# Patient Record
Sex: FEMALE | Race: BLACK OR AFRICAN AMERICAN | Employment: FULL TIME | URBAN - METROPOLITAN AREA
[De-identification: names, ages, dates, MRNs, and addresses within clinical notes are randomized per-mention and may not be internally consistent; named-entity substitution may affect disease eponyms.]

---

## 2018-10-11 ENCOUNTER — HOSPITAL ENCOUNTER (EMERGENCY)
Age: 48
Discharge: HOME | End: 2018-10-11
Attending: EMERGENCY MEDICINE
Payer: COMMERCIAL

## 2018-10-11 VITALS
WEIGHT: 150 LBS | SYSTOLIC BLOOD PRESSURE: 131 MMHG | DIASTOLIC BLOOD PRESSURE: 76 MMHG | RESPIRATION RATE: 16 BRPM | OXYGEN SATURATION: 99 % | TEMPERATURE: 98.5 F | BODY MASS INDEX: 27.6 KG/M2 | HEIGHT: 62 IN | HEART RATE: 54 BPM

## 2018-10-11 DIAGNOSIS — G43.009 MIGRAINE WITHOUT AURA AND WITHOUT STATUS MIGRAINOSUS, NOT INTRACTABLE: Primary | ICD-10-CM

## 2018-10-11 PROCEDURE — 96372 THER/PROPH/DIAG INJ SC/IM: CPT

## 2018-10-11 PROCEDURE — 99283 EMERGENCY DEPT VISIT LOW MDM: CPT

## 2018-10-11 PROCEDURE — 6360000002 HC RX W HCPCS: Performed by: EMERGENCY MEDICINE

## 2018-10-11 RX ORDER — BUTALBITAL, ACETAMINOPHEN, CAFFEINE AND CODEINE PHOSPHATE 300; 50; 40; 30 MG/1; MG/1; MG/1; MG/1
1 CAPSULE ORAL EVERY 6 HOURS PRN
Qty: 12 CAPSULE | Refills: 0 | Status: SHIPPED | OUTPATIENT
Start: 2018-10-11 | End: 2018-10-21

## 2018-10-11 RX ADMIN — HYDROMORPHONE HYDROCHLORIDE 1 MG: 1 INJECTION, SOLUTION INTRAMUSCULAR; INTRAVENOUS; SUBCUTANEOUS at 18:56

## 2018-10-11 RX ADMIN — PROCHLORPERAZINE EDISYLATE 10 MG: 5 INJECTION INTRAMUSCULAR; INTRAVENOUS at 18:56

## 2018-10-11 ASSESSMENT — PAIN DESCRIPTION - DESCRIPTORS: DESCRIPTORS: PRESSURE;SHARP

## 2018-10-11 ASSESSMENT — PAIN DESCRIPTION - FREQUENCY: FREQUENCY: CONTINUOUS

## 2018-10-11 ASSESSMENT — PAIN DESCRIPTION - LOCATION: LOCATION: HEAD

## 2018-10-11 ASSESSMENT — PAIN DESCRIPTION - PAIN TYPE: TYPE: ACUTE PAIN

## 2018-10-11 ASSESSMENT — PAIN DESCRIPTION - ORIENTATION: ORIENTATION: POSTERIOR

## 2018-10-11 ASSESSMENT — PAIN SCALES - GENERAL: PAINLEVEL_OUTOF10: 6

## 2019-06-07 NOTE — PROGRESS NOTES
[facility]  [facility]. Angi Hearn M.D. Michaela Paris M.D.  Molly Minor. Tam Ferrera M.D. Danielle Escalante M.D. Nan Arzate Cap, M.D. Jeaneth Pedro   1970  Kelle Bray,       This 50-year-old woman is seen for outpatient cardiac assessment today. She has no history of cardiac or vascular disease. She has a history of migraine headaches. She has recently developed episodic rapid forceful heart action. She believes this is best described as rapid beats rather than a \"flip-flop\". There are no accompanying symptoms other than anxiety. The episodes do not clearly relate to physical activity. Some they may seem to occur more frequently at work which she has a stressful element. 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 She denies exertional chest pain or dyspnea. She is not having unprovoked sweats syncope or presyncope. She will sometimes note axillary pain unrelated to physical activity or her palpitations. She also occasionally has knee pain unrelated to physical activity. She has not had leg swelling. Medical History:  History of migraine headaches  No history of hypertension, myocardial infarction CVA or kidney disease  Lipid panel, 05/28/2019. , HDL 59, total cholesterol 226. The patient is legally . 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 Family history: Negative for MI/sudden death.   Review of Systems:  Constitutional: negative for fever and chills  Respiratory: negative for cough and hemoptysis  Cardiovascular:   Gastrointestinal: negative for abdominal pain, diarrhea, nausea and vomiting  Genitourinary:negative for dysuria and hematuria  Derm: negative for rash and skin lesion(s)  Neurological: negative for seizures and tremors  Endocrine: negative for diabetic symptoms including polydipsia and polyuria  Musculoskeletal: negative for CTD  Psychiatric: negative for psychosis and major

## 2019-06-10 ENCOUNTER — OFFICE VISIT (OUTPATIENT)
Dept: CARDIOLOGY CLINIC | Age: 49
End: 2019-06-10
Payer: COMMERCIAL

## 2019-06-10 VITALS
DIASTOLIC BLOOD PRESSURE: 70 MMHG | WEIGHT: 147.2 LBS | BODY MASS INDEX: 27.09 KG/M2 | SYSTOLIC BLOOD PRESSURE: 108 MMHG | HEART RATE: 55 BPM | RESPIRATION RATE: 16 BRPM | HEIGHT: 62 IN

## 2019-06-10 DIAGNOSIS — R06.02 SHORTNESS OF BREATH: ICD-10-CM

## 2019-06-10 DIAGNOSIS — R00.2 PALPITATIONS: ICD-10-CM

## 2019-06-10 DIAGNOSIS — I51.9 HEART PROBLEM: Primary | ICD-10-CM

## 2019-06-10 PROCEDURE — 93000 ELECTROCARDIOGRAM COMPLETE: CPT | Performed by: INTERNAL MEDICINE

## 2019-06-10 PROCEDURE — G8427 DOCREV CUR MEDS BY ELIG CLIN: HCPCS | Performed by: INTERNAL MEDICINE

## 2019-06-10 PROCEDURE — G8419 CALC BMI OUT NRM PARAM NOF/U: HCPCS | Performed by: INTERNAL MEDICINE

## 2019-06-10 PROCEDURE — 99242 OFF/OP CONSLTJ NEW/EST SF 20: CPT | Performed by: INTERNAL MEDICINE

## 2019-06-27 ENCOUNTER — NURSE ONLY (OUTPATIENT)
Dept: CARDIOLOGY CLINIC | Age: 49
End: 2019-06-27

## 2019-06-27 ENCOUNTER — HOSPITAL ENCOUNTER (OUTPATIENT)
Dept: CARDIOLOGY | Age: 49
Discharge: HOME | End: 2019-06-27
Payer: COMMERCIAL

## 2019-06-27 DIAGNOSIS — R06.02 SHORTNESS OF BREATH: ICD-10-CM

## 2019-06-27 DIAGNOSIS — R00.2 PALPITATIONS: ICD-10-CM

## 2019-06-27 LAB
LV EF: 60 %
LVEF MODALITY: NORMAL

## 2019-06-27 PROCEDURE — 93306 TTE W/DOPPLER COMPLETE: CPT

## 2019-06-27 NOTE — PROGRESS NOTES
Pt was seen if office today for the placement of a 48 hour holter monitor ([employer]) per . Pt tolerated well and understood instructions.  Device # [id number]    [facility]

## 2019-07-08 ENCOUNTER — TELEPHONE (OUTPATIENT)
Dept: CARDIOLOGY CLINIC | Age: 49
End: 2019-07-08

## 2019-07-08 DIAGNOSIS — R00.2 PALPITATIONS: ICD-10-CM

## 2019-07-08 DIAGNOSIS — R06.02 SHORTNESS OF BREATH: ICD-10-CM

## 2021-12-10 ENCOUNTER — APPOINTMENT (OUTPATIENT)
Dept: GENERAL RADIOLOGY | Age: 51
End: 2021-12-10
Payer: COMMERCIAL

## 2021-12-10 PROCEDURE — 71046 X-RAY EXAM CHEST 2 VIEWS: CPT

## 2021-12-10 PROCEDURE — 99284 EMERGENCY DEPT VISIT MOD MDM: CPT

## 2021-12-10 PROCEDURE — 96365 THER/PROPH/DIAG IV INF INIT: CPT

## 2021-12-10 ASSESSMENT — PAIN SCALES - GENERAL: PAINLEVEL_OUTOF10: 5

## 2021-12-11 ENCOUNTER — HOSPITAL ENCOUNTER (OUTPATIENT)
Age: 51
Setting detail: OBSERVATION
Discharge: HOME | End: 2021-12-12
Attending: EMERGENCY MEDICINE | Admitting: INTERNAL MEDICINE
Payer: COMMERCIAL

## 2021-12-11 ENCOUNTER — APPOINTMENT (OUTPATIENT)
Dept: CT IMAGING | Age: 51
End: 2021-12-11
Payer: COMMERCIAL

## 2021-12-11 DIAGNOSIS — R06.02 SHORTNESS OF BREATH: Primary | ICD-10-CM

## 2021-12-11 DIAGNOSIS — J18.9 PNEUMONIA OF BOTH LUNGS DUE TO INFECTIOUS ORGANISM, UNSPECIFIED PART OF LUNG: ICD-10-CM

## 2021-12-11 DIAGNOSIS — U07.1 COVID: ICD-10-CM

## 2021-12-11 LAB
ALBUMIN SERPL-MCNC: 4.3 G/DL (ref 3.5–5.2)
ALP BLD-CCNC: 64 U/L (ref 35–104)
ALT SERPL-CCNC: 12 U/L (ref 0–32)
ANION GAP SERPL CALCULATED.3IONS-SCNC: 12 MMOL/L (ref 7–16)
AST SERPL-CCNC: 13 U/L (ref 0–31)
BASOPHILS ABSOLUTE: 0.04 E9/L (ref 0–0.2)
BASOPHILS RELATIVE PERCENT: 0.5 % (ref 0–2)
BILIRUB SERPL-MCNC: 0.3 MG/DL (ref 0–1.2)
BUN BLDV-MCNC: 17 MG/DL (ref 6–20)
C-REACTIVE PROTEIN: 1.9 MG/DL (ref 0–0.4)
CALCIUM SERPL-MCNC: 9.4 MG/DL (ref 8.6–10.2)
CHLORIDE BLD-SCNC: 101 MMOL/L (ref 98–107)
CO2: 26 MMOL/L (ref 22–29)
CREAT SERPL-MCNC: 0.7 MG/DL (ref 0.5–1)
D DIMER: 466 NG/ML DDU
EKG ATRIAL RATE: 60 BPM
EKG P AXIS: 59 DEGREES
EKG P-R INTERVAL: 210 MS
EKG Q-T INTERVAL: 414 MS
EKG QRS DURATION: 84 MS
EKG QTC CALCULATION (BAZETT): 414 MS
EKG R AXIS: 46 DEGREES
EKG T AXIS: 44 DEGREES
EKG VENTRICULAR RATE: 60 BPM
EOSINOPHILS ABSOLUTE: 0.09 E9/L (ref 0.05–0.5)
EOSINOPHILS RELATIVE PERCENT: 1 % (ref 0–6)
GFR AFRICAN AMERICAN: >60
GFR NON-AFRICAN AMERICAN: >60 ML/MIN/1.73
GLUCOSE BLD-MCNC: 97 MG/DL (ref 74–99)
HCT VFR BLD CALC: 38.1 % (ref 34–48)
HEMOGLOBIN: 12.4 G/DL (ref 11.5–15.5)
IMMATURE GRANULOCYTES #: 0.03 E9/L
IMMATURE GRANULOCYTES %: 0.3 % (ref 0–5)
LYMPHOCYTES ABSOLUTE: 3.42 E9/L (ref 1.5–4)
LYMPHOCYTES RELATIVE PERCENT: 38.7 % (ref 20–42)
MCH RBC QN AUTO: 30.6 PG (ref 26–35)
MCHC RBC AUTO-ENTMCNC: 32.5 % (ref 32–34.5)
MCV RBC AUTO: 94.1 FL (ref 80–99.9)
MONOCYTES ABSOLUTE: 0.87 E9/L (ref 0.1–0.95)
MONOCYTES RELATIVE PERCENT: 9.8 % (ref 2–12)
NEUTROPHILS ABSOLUTE: 4.39 E9/L (ref 1.8–7.3)
NEUTROPHILS RELATIVE PERCENT: 49.7 % (ref 43–80)
PDW BLD-RTO: 11.9 FL (ref 11.5–15)
PLATELET # BLD: 344 E9/L (ref 130–450)
PMV BLD AUTO: 10 FL (ref 7–12)
POTASSIUM SERPL-SCNC: 3.9 MMOL/L (ref 3.5–5)
PROCALCITONIN: 0.03 NG/ML (ref 0–0.08)
PROCALCITONIN: 0.04 NG/ML (ref 0–0.08)
RBC # BLD: 4.05 E12/L (ref 3.5–5.5)
SEDIMENTATION RATE, ERYTHROCYTE: 60 MM/HR (ref 0–20)
SODIUM BLD-SCNC: 139 MMOL/L (ref 132–146)
TOTAL PROTEIN: 8.2 G/DL (ref 6.4–8.3)
TROPONIN, HIGH SENSITIVITY: <6 NG/L (ref 0–9)
WBC # BLD: 8.8 E9/L (ref 4.5–11.5)

## 2021-12-11 PROCEDURE — 96376 TX/PRO/DX INJ SAME DRUG ADON: CPT

## 2021-12-11 PROCEDURE — 84484 ASSAY OF TROPONIN QUANT: CPT

## 2021-12-11 PROCEDURE — 6360000002 HC RX W HCPCS: Performed by: EMERGENCY MEDICINE

## 2021-12-11 PROCEDURE — 86140 C-REACTIVE PROTEIN: CPT

## 2021-12-11 PROCEDURE — 85378 FIBRIN DEGRADE SEMIQUANT: CPT

## 2021-12-11 PROCEDURE — 6360000002 HC RX W HCPCS: Performed by: NURSE PRACTITIONER

## 2021-12-11 PROCEDURE — G0378 HOSPITAL OBSERVATION PER HR: HCPCS

## 2021-12-11 PROCEDURE — 6370000000 HC RX 637 (ALT 250 FOR IP): Performed by: NURSE PRACTITIONER

## 2021-12-11 PROCEDURE — 84145 PROCALCITONIN (PCT): CPT

## 2021-12-11 PROCEDURE — 96372 THER/PROPH/DIAG INJ SC/IM: CPT

## 2021-12-11 PROCEDURE — 80053 COMPREHEN METABOLIC PANEL: CPT

## 2021-12-11 PROCEDURE — 2580000003 HC RX 258: Performed by: NURSE PRACTITIONER

## 2021-12-11 PROCEDURE — APPSS30 APP SPLIT SHARED TIME 16-30 MINUTES: Performed by: NURSE PRACTITIONER

## 2021-12-11 PROCEDURE — 99220 PR INITIAL OBSERVATION CARE/DAY 70 MINUTES: CPT | Performed by: INTERNAL MEDICINE

## 2021-12-11 PROCEDURE — 0202U NFCT DS 22 TRGT SARS-COV-2: CPT

## 2021-12-11 PROCEDURE — 96375 TX/PRO/DX INJ NEW DRUG ADDON: CPT

## 2021-12-11 PROCEDURE — 6360000004 HC RX CONTRAST MEDICATION: Performed by: RADIOLOGY

## 2021-12-11 PROCEDURE — 85651 RBC SED RATE NONAUTOMATED: CPT

## 2021-12-11 PROCEDURE — 85025 COMPLETE CBC W/AUTO DIFF WBC: CPT

## 2021-12-11 PROCEDURE — 71275 CT ANGIOGRAPHY CHEST: CPT

## 2021-12-11 PROCEDURE — 93010 ELECTROCARDIOGRAM REPORT: CPT | Performed by: INTERNAL MEDICINE

## 2021-12-11 PROCEDURE — 93005 ELECTROCARDIOGRAM TRACING: CPT | Performed by: NURSE PRACTITIONER

## 2021-12-11 RX ORDER — POLYETHYLENE GLYCOL 3350 17 G/17G
17 POWDER, FOR SOLUTION ORAL DAILY PRN
Status: DISCONTINUED | OUTPATIENT
Start: 2021-12-11 | End: 2021-12-12 | Stop reason: HOSPADM

## 2021-12-11 RX ORDER — ACETAMINOPHEN 650 MG/1
650 SUPPOSITORY RECTAL EVERY 6 HOURS PRN
Status: DISCONTINUED | OUTPATIENT
Start: 2021-12-11 | End: 2021-12-12 | Stop reason: HOSPADM

## 2021-12-11 RX ORDER — ALPRAZOLAM 0.25 MG/1
0.5 TABLET ORAL NIGHTLY PRN
Status: DISCONTINUED | OUTPATIENT
Start: 2021-12-11 | End: 2021-12-11

## 2021-12-11 RX ORDER — SODIUM CHLORIDE 0.9 % (FLUSH) 0.9 %
5-40 SYRINGE (ML) INJECTION EVERY 12 HOURS SCHEDULED
Status: DISCONTINUED | OUTPATIENT
Start: 2021-12-11 | End: 2021-12-12 | Stop reason: HOSPADM

## 2021-12-11 RX ORDER — KETOROLAC TROMETHAMINE 30 MG/ML
15 INJECTION, SOLUTION INTRAMUSCULAR; INTRAVENOUS EVERY 6 HOURS
Status: DISCONTINUED | OUTPATIENT
Start: 2021-12-11 | End: 2021-12-12 | Stop reason: HOSPADM

## 2021-12-11 RX ORDER — ONDANSETRON 2 MG/ML
4 INJECTION INTRAMUSCULAR; INTRAVENOUS EVERY 6 HOURS PRN
Status: DISCONTINUED | OUTPATIENT
Start: 2021-12-11 | End: 2021-12-12 | Stop reason: HOSPADM

## 2021-12-11 RX ORDER — ATORVASTATIN CALCIUM 10 MG/1
10 TABLET, FILM COATED ORAL DAILY
Status: DISCONTINUED | OUTPATIENT
Start: 2021-12-11 | End: 2021-12-12 | Stop reason: HOSPADM

## 2021-12-11 RX ORDER — TOPIRAMATE 50 MG/1
25 TABLET, FILM COATED ORAL 2 TIMES DAILY
Status: DISCONTINUED | OUTPATIENT
Start: 2021-12-11 | End: 2021-12-11

## 2021-12-11 RX ORDER — LEVOFLOXACIN 5 MG/ML
750 INJECTION, SOLUTION INTRAVENOUS ONCE
Status: COMPLETED | OUTPATIENT
Start: 2021-12-11 | End: 2021-12-11

## 2021-12-11 RX ORDER — ACETAMINOPHEN 325 MG/1
650 TABLET ORAL EVERY 6 HOURS PRN
Status: DISCONTINUED | OUTPATIENT
Start: 2021-12-11 | End: 2021-12-12 | Stop reason: HOSPADM

## 2021-12-11 RX ORDER — SODIUM CHLORIDE 0.9 % (FLUSH) 0.9 %
5-40 SYRINGE (ML) INJECTION PRN
Status: DISCONTINUED | OUTPATIENT
Start: 2021-12-11 | End: 2021-12-12 | Stop reason: HOSPADM

## 2021-12-11 RX ORDER — SODIUM CHLORIDE 9 MG/ML
25 INJECTION, SOLUTION INTRAVENOUS PRN
Status: DISCONTINUED | OUTPATIENT
Start: 2021-12-11 | End: 2021-12-12 | Stop reason: HOSPADM

## 2021-12-11 RX ORDER — ATORVASTATIN CALCIUM 10 MG/1
10 TABLET, FILM COATED ORAL DAILY
COMMUNITY
End: 2022-06-15 | Stop reason: SINTOL

## 2021-12-11 RX ORDER — ONDANSETRON 4 MG/1
4 TABLET, ORALLY DISINTEGRATING ORAL EVERY 8 HOURS PRN
Status: DISCONTINUED | OUTPATIENT
Start: 2021-12-11 | End: 2021-12-12 | Stop reason: HOSPADM

## 2021-12-11 RX ORDER — ALBUTEROL SULFATE 90 UG/1
2 AEROSOL, METERED RESPIRATORY (INHALATION) EVERY 6 HOURS PRN
Status: DISCONTINUED | OUTPATIENT
Start: 2021-12-11 | End: 2021-12-12 | Stop reason: HOSPADM

## 2021-12-11 RX ORDER — DOXYCYCLINE HYCLATE 100 MG/1
100 CAPSULE ORAL ONCE
Status: DISCONTINUED | OUTPATIENT
Start: 2021-12-11 | End: 2021-12-11

## 2021-12-11 RX ADMIN — ATORVASTATIN CALCIUM 10 MG: 10 TABLET, FILM COATED ORAL at 22:57

## 2021-12-11 RX ADMIN — ENOXAPARIN SODIUM 40 MG: 100 INJECTION SUBCUTANEOUS at 15:31

## 2021-12-11 RX ADMIN — SODIUM CHLORIDE, PRESERVATIVE FREE 10 ML: 5 INJECTION INTRAVENOUS at 22:57

## 2021-12-11 RX ADMIN — KETOROLAC TROMETHAMINE 15 MG: 30 INJECTION, SOLUTION INTRAMUSCULAR; INTRAVENOUS at 22:57

## 2021-12-11 RX ADMIN — LEVOFLOXACIN 750 MG: 5 INJECTION, SOLUTION INTRAVENOUS at 10:27

## 2021-12-11 RX ADMIN — IOPAMIDOL 75 ML: 755 INJECTION, SOLUTION INTRAVENOUS at 07:29

## 2021-12-11 ASSESSMENT — ENCOUNTER SYMPTOMS
ABDOMINAL DISTENTION: 0
SPUTUM PRODUCTION: 0
SINUS PRESSURE: 0
SHORTNESS OF BREATH: 1
EYE REDNESS: 0
WHEEZING: 0
SORE THROAT: 0
ABDOMINAL PAIN: 0
NAUSEA: 0
EYE DISCHARGE: 0
DIARRHEA: 0
BACK PAIN: 0
COUGH: 0
EYE PAIN: 0
VOMITING: 0

## 2021-12-11 ASSESSMENT — PAIN SCALES - GENERAL
PAINLEVEL_OUTOF10: 0
PAINLEVEL_OUTOF10: 0

## 2021-12-11 NOTE — ED NOTES
Introduced self in room. Per Joy Ozuna RN food tray has been ordered. No further pt concerns. Call light in reach.      Nette Moon RN  12/11/21 [id number]

## 2021-12-11 NOTE — ED NOTES
FIRST PROVIDER CONTACT ASSESSMENT NOTE      Department of Emergency Medicine   12/10/21  7:38 PM EST    Chief Complaint: Shortness of Breath (covid in November, sent in by physician to r/o PE.  SpO2 100 % upon arrival to ER. )      History of Present Illness:   Flo Reyes is a 49 y.o. female who presents to the ED for    Medical History:  has a past medical history of Migraines. Surgical History:  has a past surgical history that includes Hysterectomy (2009); Tubal ligation (1999); and Toe Surgery. Social History:  reports that she has been smoking cigarettes. She has never used smokeless tobacco. She reports current alcohol use. She reports that she does not use drugs. Family History: family history includes Cancer in her father; High Blood Pressure in her mother.     *ALLERGIES*     Keflex [cephalexin], Pcn [penicillins], and Phenergan [promethazine hcl]     Physical Exam:      VS:  BP (!) 169/83   Pulse 94   Temp 97.9 °F (36.6 °C)   Resp 16   SpO2 98%      Initial Plan of Care:  Initiate Treatment-Testing, Proceed toTreatment Area When Bed Available for ED Attending/MLP to Continue Care    -----------------END OF FIRST PROVIDER CONTACT ASSESSMENT NOTE--------------  Electronically signed by SHAHEEN Santana CNP   DD: 12/10/21             SHAHEEN Laguna CNP  12/10/21 1938

## 2021-12-11 NOTE — ED PROVIDER NOTES
64-year-old female presents to the emergency department for evaluation for possible rule out blood clot. The patient was suffering from Covid during November had recovered but then was complaining still of some shortness of breath her primary care physician he sent her in to have a rule out PE study. She states no chest pain no fevers no cough no nausea vomiting dye abdominal pain urinary symptoms leg swelling or other complaints. She does state mild shortness of breath is been chronic since being diagnosed with Covid. The history is provided by the patient. Shortness of Breath  Severity:  Mild  Onset quality:  Gradual  Duration:  1 month  Timing:  Intermittent  Progression:  Waxing and waning  Chronicity:  New  Relieved by:  Nothing  Worsened by:  Nothing  Ineffective treatments:  None tried  Associated symptoms: no abdominal pain, no chest pain, no cough, no ear pain, no fever, no headaches, no PND, no rash, no sore throat, no sputum production, no syncope, no vomiting and no wheezing         Review of Systems   Constitutional: Negative for chills and fever. HENT: Negative for ear pain, sinus pressure and sore throat. Eyes: Negative for pain, discharge and redness. Respiratory: Positive for shortness of breath. Negative for cough, sputum production and wheezing. Cardiovascular: Negative for chest pain, syncope and PND. Gastrointestinal: Negative for abdominal distention, abdominal pain, diarrhea, nausea and vomiting. Genitourinary: Negative for dysuria and frequency. Musculoskeletal: Negative for arthralgias and back pain. Skin: Negative for rash and wound. Neurological: Negative for weakness and headaches. Hematological: Negative for adenopathy. All other systems reviewed and are negative. Physical Exam  Constitutional:       Appearance: She is well-developed. HENT:      Head: Normocephalic and atraumatic. Eyes:      Extraocular Movements: Extraocular movements intact. Pupils: Pupils are equal, round, and reactive to light. Cardiovascular:      Rate and Rhythm: Normal rate and regular rhythm. Pulmonary:      Effort: Pulmonary effort is normal.      Breath sounds: Normal breath sounds. No decreased breath sounds, wheezing, rhonchi or rales. Abdominal:      Palpations: Abdomen is soft. Musculoskeletal:         General: Normal range of motion. Right lower leg: No tenderness. No edema. Left lower leg: No tenderness. No edema. Skin:     General: Skin is warm. Capillary Refill: Capillary refill takes less than 2 seconds. Neurological:      General: No focal deficit present. Mental Status: She is alert and oriented to person, place, and time. Procedures     MDM      Patient was reevaluated. I did review her CT scan which showed bilateral infiltrates, Negative for PE. Patient was started on Levaquin 750 IV. She was also placed on 2 L nasal cannula for hypoxia with exertion. Consultation was made with hospitalist service Dr. Silvina Marin who agreed to admit the patient.    --------------------------------------------- PAST HISTORY ---------------------------------------------  Past Medical History:  has a past medical history of Migraines. Past Surgical History:  has a past surgical history that includes Hysterectomy (2009); Tubal ligation (1999); and Toe Surgery. Social History:  reports that she has been smoking cigarettes. She has never used smokeless tobacco. She reports current alcohol use. She reports that she does not use drugs. Family History: family history includes Cancer in her father; High Blood Pressure in her mother. The patients home medications have been reviewed.     Allergies: Keflex [cephalexin], Pcn [penicillins], and Phenergan [promethazine hcl]    -------------------------------------------------- RESULTS -------------------------------------------------  Labs:  Results for orders placed or performed during the hospital encounter of 12/11/21   CBC Auto Differential   Result Value Ref Range    WBC 8.8 4.5 - 11.5 E9/L    RBC 4.05 3.50 - 5.50 E12/L    Hemoglobin 12.4 11.5 - 15.5 g/dL    Hematocrit 38.1 34.0 - 48.0 %    MCV 94.1 80.0 - 99.9 fL    MCH 30.6 26.0 - 35.0 pg    MCHC 32.5 32.0 - 34.5 %    RDW 11.9 11.5 - 15.0 fL    Platelets 965 078 - 685 E9/L    MPV 10.0 7.0 - 12.0 fL    Neutrophils % 49.7 43.0 - 80.0 %    Immature Granulocytes % 0.3 0.0 - 5.0 %    Lymphocytes % 38.7 20.0 - 42.0 %    Monocytes % 9.8 2.0 - 12.0 %    Eosinophils % 1.0 0.0 - 6.0 %    Basophils % 0.5 0.0 - 2.0 %    Neutrophils Absolute 4.39 1.80 - 7.30 E9/L    Immature Granulocytes # 0.03 E9/L    Lymphocytes Absolute 3.42 1.50 - 4.00 E9/L    Monocytes Absolute 0.87 0.10 - 0.95 E9/L    Eosinophils Absolute 0.09 0.05 - 0.50 E9/L    Basophils Absolute 0.04 0.00 - 0.20 E9/L   Comprehensive Metabolic Panel   Result Value Ref Range    Sodium 139 132 - 146 mmol/L    Potassium 3.9 3.5 - 5.0 mmol/L    Chloride 101 98 - 107 mmol/L    CO2 26 22 - 29 mmol/L    Anion Gap 12 7 - 16 mmol/L    Glucose 97 74 - 99 mg/dL    BUN 17 6 - 20 mg/dL    CREATININE 0.7 0.5 - 1.0 mg/dL    GFR Non-African American >60 >=60 mL/min/1.73    GFR African American >60     Calcium 9.4 8.6 - 10.2 mg/dL    Total Protein 8.2 6.4 - 8.3 g/dL    Albumin 4.3 3.5 - 5.2 g/dL    Total Bilirubin 0.3 0.0 - 1.2 mg/dL    Alkaline Phosphatase 64 35 - 104 U/L    ALT 12 0 - 32 U/L    AST 13 0 - 31 U/L   Troponin   Result Value Ref Range    Troponin, High Sensitivity <6 0 - 9 ng/L   D-Dimer, Quantitative   Result Value Ref Range    D-Dimer, Quant 466 ng/mL DDU   EKG 12 Lead   Result Value Ref Range    Ventricular Rate 60 BPM    Atrial Rate 60 BPM    P-R Interval 210 ms    QRS Duration 84 ms    Q-T Interval 414 ms    QTc Calculation (Bazett) 414 ms    P Axis 59 degrees    R Axis 46 degrees    T Axis 44 degrees       Radiology:  CTA PULMONARY W CONTRAST   Preliminary Result   1.  Mild mainly lower lobe pulmonary opacities, nonspecific but would be   compatible with low-grade COVID pneumonia, and atelectasis. 2. No acute pulmonary embolus identified. XR CHEST (2 VW)   Final Result   No acute radiographic abnormality.             ------------------------- NURSING NOTES AND VITALS REVIEWED ---------------------------  Date / Time Roomed:  12/11/2021  4:19 AM  ED Bed Assignment:  15/15    The nursing notes within the ED encounter and vital signs as below have been reviewed. BP (!) 166/82   Pulse 110   Temp 97.9 °F (36.6 °C)   Resp 22   SpO2 96%   Oxygen Saturation Interpretation: Abnormal 87 % with Ambulation placed on 2 L NC       ------------------------------------------ PROGRESS NOTES ------------------------------------------  5:08 AM EST  I have spoken with the patient and discussed todays results, in addition to providing specific details for the plan of care and counseling regarding the diagnosis and prognosis. Their questions are answered at this time and they are agreeable with the plan. Patient was hypoxic with exertion. Discussed admission with the patient agreed. To be placed on supplemental oxygen and antibiotics. CRITICAL CARE:   30 MINUTES. Please note that the withdrawal or failure to initiate urgent interventions for this patient would likely result in a life threatening deterioration or permanent disability. Accordingly this patient received the above mentioned time, excluding separately billable procedures. --------------------------------- ADDITIONAL PROVIDER NOTES ---------------------------------  At this time the patient is without objective evidence of an acute process requiring hospitalization or inpatient management. They have remained hemodynamically stable throughout their entire ED visit and are stable for discharge with outpatient follow-up.      The plan has been discussed in detail and they are aware of the specific conditions for emergent return, as well as the importance of follow-up. New Prescriptions    No medications on file       Diagnosis:  1. Shortness of breath New Problem   2. Pneumonia of both lungs due to infectious organism, unspecified part of lung New Problem   3. COVID        Disposition:  Patient's disposition: Admit to telemetry   Patient's condition is stable.        Richard Kaur, DO  12/11/21 1058

## 2021-12-11 NOTE — ED NOTES
Pt walked about 250 feet on RA. HR started at 102 and ended at 115. SAO2 started at 96% and ended at 87%. Dr Boyd Higginbotham notified.       Linsey Knight RN  12/11/21 [id number]

## 2021-12-11 NOTE — H&P
[facility] History and Physical          ASSESSMENT:      Active Problems:    Pneumonia  Resolved Problems:    * No resolved hospital problems. *      PLAN:    1. Exertional Hypoxia  - she is s/p recent COVID infection, positive test date 11/13. She has exertional dyspnea, and exercise oximetry in ED indicated desaturation to 87%. She has been stable on room air 98% while in ED. CTA is negative for PE, pleural effusion, focal consolidation. Does have atelectatic changes and bibasilar small GGOs c/w covid viral pneumonitis. She denies any current infectious symptoms, no sputum production, no fever/chills, she is without leukocytosis - nothing to suggest a bacterial pneumonia at this time. Added procalcitonin, at this time will hold off on any additional antibiotics. - describes anterior chest discomfort worse when lying supine and improves sitting upright. With recent COVID could consider viral pericarditis. Will add on SR and CRP. Continue with IV Toradol scheduled and follow pain response.  - Repeat ambulatory pulse ox tomorrow. - encourage incentive spirometry  - Albuterol PRN. No current bronchospasm to warrant steroid therapy. 2. HLD  - continue statin    Code Status: FULL  DVT prophylaxis: Lovenox       CHIEF COMPLAINT:  SOB    History of Present Illness:      Lulú Estes is a delightful 49year old female with hx only of HLD she is on lipitor. She is vaccinated for COVID, had 2 doses Moderna in August.    Tested COVID positive 11/13, after exposure to ill grandchildren. States she had only mild symptoms at that time, some coughing and malaise but otherwise no fevers/chills or significant SOB. She mostly recovered from those symptoms up until this past 1 week she has noted increased exertional dyspnea. She would feel fine at rest, but become quite SOB with activity.  Found it difficult to climb a flight of stairs without stopping to rest. During this past week also noting some left anterior chest discomfort that radiates to left scapular, and is present only at rest and when lying supine. States pain would resolve either with activity or if sitting upright/leaning forward. She was directed to ED by PCP with concern for possible PE given description of above symptoms. In ED - she has been 98% on room air while at rest, without tachypnea or respiratory distress. Her d dimer was normal, but proceeded with CTA chest. Study is negative for PE. Did show bilateral lower lobe ground-glass opacities/atelectasis suggestive of viral pneumonia and would be c/w her known recent COVID infection. There was no focal consolidation or infiltrates, no effusions, no pericardial changes noted on report. Ambulated 250 feet with nursing in ED - SaO2 dropped to 87% and therefore admission requested. She was given a dose of IV Levaquin while in ED. Informant(s) for H&P: patient    REVIEW OF SYSTEMS:  A comprehensive review of systems was negative except for: what is in the HPI      PMH:  Past Medical History:   Diagnosis Date    Migraines        Surgical History:  Past Surgical History:   Procedure Laterality Date    HYSTERECTOMY  2009    TOE SURGERY      TUBAL LIGATION  1999       Medications Prior to Admission:    Prior to Admission medications    Medication Sig Start Date End Date Taking? Authorizing Provider   atorvastatin (LIPITOR) 10 MG tablet Take 10 mg by mouth daily   Yes Historical Provider, MD       Allergies:    Keflex [cephalexin], Pcn [penicillins], and Phenergan [promethazine hcl]    Social History:   She denies current use of tobacco, does not vape. Drinks alcohol sparingly in social situations. Family History:   family history includes Cancer in her father; High Blood Pressure in her mother.       PHYSICAL EXAM:  Vitals:  BP (!) 166/82   Pulse 98   Temp 98 °F (36.7 °C) (Oral)   Resp 14   Ht 5' 2\" (1.575 m)   Wt 145 lb (65.8 kg)   SpO2 98%   BMI 26.52 kg/m²     General Appearance: alert and oriented to person, place and time and in no acute distress  Skin: warm and dry  Head: normocephalic and atraumatic  Eyes: pupils equal, round, and reactive to light, extraocular eye movements intact, conjunctivae normal  Neck: neck supple and non tender without mass   Pulmonary/Chest: clear to auscultation bilaterally- no wheezes, rales or rhonchi, normal air movement, no respiratory distress  Some reproducible chest discomfort  Cardiovascular: normal rate, normal S1 and S2 and no carotid bruits  Abdomen: soft, non-tender, non-distended, normal bowel sounds, no masses or organomegaly  Extremities: no cyanosis, no clubbing and no edema  Neurologic: no cranial nerve deficit and speech normal        LABS:  Recent Labs     12/11/21  0511      K 3.9      CO2 26   BUN 17   CREATININE 0.7   GLUCOSE 97   CALCIUM 9.4       Recent Labs     12/11/21  0511   WBC 8.8   RBC 4.05   HGB 12.4   HCT 38.1   MCV 94.1   MCH 30.6   MCHC 32.5   RDW 11.9      MPV 10.0       No results for input(s): POCGLU in the last 72 hours. Radiology:   CTA PULMONARY W CONTRAST   Final Result   1. Mild mainly lower lobe pulmonary opacities suggestive of minimal   atelectasis and possible pneumonitis including atypical infection such as   COVID. 2. No acute pulmonary embolus identified. 3. Small right lower lobe pulmonary nodule as described above. XR CHEST (2 VW)   Final Result   No acute radiographic abnormality. EKG: sinus rhythm with 1st deg AVB      NOTE: This report was transcribed using voice recognition software. Every effort was made to ensure accuracy; however, inadvertent computerized transcription errors may be present.   Electronically signed by SHAHEEN Sigala CNP on 12/11/2021 at 3:26 PM

## 2021-12-11 NOTE — ED NOTES
Nutrition services contacted, states box lunches to be sent to ED shortly. [place] provided to pt. No further concerns expressed.        Sherri Hall RN  12/11/21 0899

## 2021-12-12 VITALS
DIASTOLIC BLOOD PRESSURE: 84 MMHG | HEIGHT: 62 IN | BODY MASS INDEX: 29.08 KG/M2 | HEART RATE: 64 BPM | RESPIRATION RATE: 16 BRPM | SYSTOLIC BLOOD PRESSURE: 130 MMHG | OXYGEN SATURATION: 97 % | WEIGHT: 158 LBS | TEMPERATURE: 98.4 F

## 2021-12-12 LAB
ADENOVIRUS BY PCR: NOT DETECTED
ALBUMIN SERPL-MCNC: 3.7 G/DL (ref 3.5–5.2)
ALP BLD-CCNC: 64 U/L (ref 35–104)
ALT SERPL-CCNC: 9 U/L (ref 0–32)
ANION GAP SERPL CALCULATED.3IONS-SCNC: 10 MMOL/L (ref 7–16)
AST SERPL-CCNC: 11 U/L (ref 0–31)
BASOPHILS ABSOLUTE: 0.02 E9/L (ref 0–0.2)
BASOPHILS RELATIVE PERCENT: 0.3 % (ref 0–2)
BILIRUB SERPL-MCNC: 0.2 MG/DL (ref 0–1.2)
BORDETELLA PARAPERTUSSIS BY PCR: NOT DETECTED
BORDETELLA PERTUSSIS BY PCR: NOT DETECTED
BUN BLDV-MCNC: 16 MG/DL (ref 6–20)
CALCIUM SERPL-MCNC: 9.4 MG/DL (ref 8.6–10.2)
CHLAMYDOPHILIA PNEUMONIAE BY PCR: NOT DETECTED
CHLORIDE BLD-SCNC: 104 MMOL/L (ref 98–107)
CO2: 22 MMOL/L (ref 22–29)
CORONAVIRUS 229E BY PCR: NOT DETECTED
CORONAVIRUS HKU1 BY PCR: NOT DETECTED
CORONAVIRUS NL63 BY PCR: NOT DETECTED
CORONAVIRUS OC43 BY PCR: NOT DETECTED
CREAT SERPL-MCNC: 0.6 MG/DL (ref 0.5–1)
EOSINOPHILS ABSOLUTE: 0.06 E9/L (ref 0.05–0.5)
EOSINOPHILS RELATIVE PERCENT: 0.9 % (ref 0–6)
GFR AFRICAN AMERICAN: >60
GFR NON-AFRICAN AMERICAN: >60 ML/MIN/1.73
GLUCOSE BLD-MCNC: 102 MG/DL (ref 74–99)
HCT VFR BLD CALC: 35.8 % (ref 34–48)
HEMOGLOBIN: 11.8 G/DL (ref 11.5–15.5)
HUMAN METAPNEUMOVIRUS BY PCR: NOT DETECTED
HUMAN RHINOVIRUS/ENTEROVIRUS BY PCR: NOT DETECTED
IMMATURE GRANULOCYTES #: 0.03 E9/L
IMMATURE GRANULOCYTES %: 0.4 % (ref 0–5)
INFLUENZA A BY PCR: NOT DETECTED
INFLUENZA B BY PCR: NOT DETECTED
LYMPHOCYTES ABSOLUTE: 2.67 E9/L (ref 1.5–4)
LYMPHOCYTES RELATIVE PERCENT: 38.8 % (ref 20–42)
MCH RBC QN AUTO: 30.7 PG (ref 26–35)
MCHC RBC AUTO-ENTMCNC: 33 % (ref 32–34.5)
MCV RBC AUTO: 93.2 FL (ref 80–99.9)
MONOCYTES ABSOLUTE: 0.72 E9/L (ref 0.1–0.95)
MONOCYTES RELATIVE PERCENT: 10.5 % (ref 2–12)
MYCOPLASMA PNEUMONIAE BY PCR: NOT DETECTED
NEUTROPHILS ABSOLUTE: 3.38 E9/L (ref 1.8–7.3)
NEUTROPHILS RELATIVE PERCENT: 49.1 % (ref 43–80)
PARAINFLUENZA VIRUS 1 BY PCR: NOT DETECTED
PARAINFLUENZA VIRUS 2 BY PCR: NOT DETECTED
PARAINFLUENZA VIRUS 3 BY PCR: NOT DETECTED
PARAINFLUENZA VIRUS 4 BY PCR: NOT DETECTED
PDW BLD-RTO: 11.8 FL (ref 11.5–15)
PLATELET # BLD: 346 E9/L (ref 130–450)
PMV BLD AUTO: 10.4 FL (ref 7–12)
POTASSIUM REFLEX MAGNESIUM: 3.8 MMOL/L (ref 3.5–5)
RBC # BLD: 3.84 E12/L (ref 3.5–5.5)
RESPIRATORY SYNCYTIAL VIRUS BY PCR: NOT DETECTED
SARS-COV-2, PCR: NOT DETECTED
SODIUM BLD-SCNC: 136 MMOL/L (ref 132–146)
TOTAL PROTEIN: 7.1 G/DL (ref 6.4–8.3)
WBC # BLD: 6.9 E9/L (ref 4.5–11.5)

## 2021-12-12 PROCEDURE — 96376 TX/PRO/DX INJ SAME DRUG ADON: CPT

## 2021-12-12 PROCEDURE — APPSS45 APP SPLIT SHARED TIME 31-45 MINUTES: Performed by: NURSE PRACTITIONER

## 2021-12-12 PROCEDURE — 36415 COLL VENOUS BLD VENIPUNCTURE: CPT

## 2021-12-12 PROCEDURE — 85025 COMPLETE CBC W/AUTO DIFF WBC: CPT

## 2021-12-12 PROCEDURE — 80053 COMPREHEN METABOLIC PANEL: CPT

## 2021-12-12 PROCEDURE — 93005 ELECTROCARDIOGRAM TRACING: CPT | Performed by: NURSE PRACTITIONER

## 2021-12-12 PROCEDURE — 6370000000 HC RX 637 (ALT 250 FOR IP): Performed by: NURSE PRACTITIONER

## 2021-12-12 PROCEDURE — 6360000002 HC RX W HCPCS: Performed by: NURSE PRACTITIONER

## 2021-12-12 PROCEDURE — G0378 HOSPITAL OBSERVATION PER HR: HCPCS

## 2021-12-12 PROCEDURE — 96372 THER/PROPH/DIAG INJ SC/IM: CPT

## 2021-12-12 PROCEDURE — 2580000003 HC RX 258: Performed by: NURSE PRACTITIONER

## 2021-12-12 RX ORDER — IBUPROFEN 600 MG/1
600 TABLET ORAL
Qty: 42 TABLET | Refills: 0 | Status: SHIPPED | OUTPATIENT
Start: 2021-12-12 | End: 2022-06-15 | Stop reason: ALTCHOICE

## 2021-12-12 RX ADMIN — ENOXAPARIN SODIUM 40 MG: 100 INJECTION SUBCUTANEOUS at 11:06

## 2021-12-12 RX ADMIN — SODIUM CHLORIDE, PRESERVATIVE FREE 10 ML: 5 INJECTION INTRAVENOUS at 04:45

## 2021-12-12 RX ADMIN — KETOROLAC TROMETHAMINE 15 MG: 30 INJECTION, SOLUTION INTRAMUSCULAR; INTRAVENOUS at 04:45

## 2021-12-12 RX ADMIN — SODIUM CHLORIDE, PRESERVATIVE FREE 10 ML: 5 INJECTION INTRAVENOUS at 11:11

## 2021-12-12 RX ADMIN — KETOROLAC TROMETHAMINE 15 MG: 30 INJECTION, SOLUTION INTRAMUSCULAR; INTRAVENOUS at 17:22

## 2021-12-12 RX ADMIN — KETOROLAC TROMETHAMINE 15 MG: 30 INJECTION, SOLUTION INTRAMUSCULAR; INTRAVENOUS at 11:07

## 2021-12-12 RX ADMIN — ATORVASTATIN CALCIUM 10 MG: 10 TABLET, FILM COATED ORAL at 11:07

## 2021-12-12 ASSESSMENT — PAIN SCALES - GENERAL
PAINLEVEL_OUTOF10: 0
PAINLEVEL_OUTOF10: 4
PAINLEVEL_OUTOF10: 5
PAINLEVEL_OUTOF10: 0

## 2021-12-12 NOTE — DISCHARGE SUMMARY
[facility] Physician Discharge Summary       Lolis Gonzalez  PCP  Follow up in 1 week    Activity level: As tolerated     Dispo: Home      Condition on discharge: Stable     Patient ID:  Martha Caro  [id number]  49 y.o.  1970    Admit date: 12/11/2021    Discharge date and time:  12/12/2021  8:03 AM    Admission Diagnoses: Active Problems:    Pneumonia  Resolved Problems:    * No resolved hospital problems. *      Discharge Diagnoses: Active Problems:    Pneumonia  Resolved Problems:    * No resolved hospital problems. *      Consults:  None    Hospital Course:   Patient Martha Caro is a 49 y.o. presented with Shortness of breath [R06.02]  Pneumonia [J18.9]  Pneumonia of both lungs due to infectious organism, unspecified part of lung [J18.9]  COVID [U07.1]    Janey Lawson is a delightful 49year old female with hx only of HLD she is on lipitor. She is vaccinated for COVID, had 2 doses Moderna in August.     Tested COVID positive 11/13, after exposure to ill grandchildren. States she had only mild symptoms at that time, some coughing and malaise but otherwise no fevers/chills or significant SOB. She mostly recovered from those symptoms up until this past 1 week she has noted increased exertional dyspnea. She would feel fine at rest, but become quite SOB with activity. Found it difficult to climb a flight of stairs without stopping to rest. During this past week also noting some left anterior chest discomfort that radiates to left scapular, and is present only at rest and when lying supine. States pain would resolve either with activity or if sitting upright/leaning forward. She was directed to ED by PCP with concern for possible PE given description of above symptoms.     In ED - she has been 98% on room air while at rest, without tachypnea or respiratory distress. Her d dimer was normal, but proceeded with CTA chest. Study is negative for PE.  Did show bilateral lower lobe ground-glass opacities/atelectasis suggestive of viral pneumonia and would be c/w her known recent COVID infection. There was no focal consolidation or infiltrates, no effusions, no pericardial changes noted on report.     Ambulated 250 feet with nursing in ED - SaO2 dropped to 87% and therefore admission requested. She was given a dose of IV Levaquin while in ED. In follow up, procalcitinon 0.04 and would argue against any secondary bacterial pneumonic process. Description of her anterior chest pain and symptom improvement when sitting erect or leaning forward raise suspicion for possible viral pericarditis  - Sed rate and CRP both elevated. She has very subtle < 1 mm ST elevation in all leads. ECHO was requested but unable to be completed on weekend due to staffing issues, she is provided a script to complete as outpatient. She was treated with NSAIDs and pain improved  Repeat exercise pulse ox testing revealed no exertional hypoxia    Will be treated as suspected post-viral pericarditis with Ibuprofen 600 mg TID with meals for 14 days. Discharge Exam:    General Appearance: alert and oriented to person, place and time and in no acute distress  Skin: warm and dry  Head: normocephalic and atraumatic  Eyes: pupils equal, round, and reactive to light, extraocular eye movements intact, conjunctivae normal  Neck: neck supple and non tender without mass   Pulmonary/Chest: clear to auscultation bilaterally- no wheezes, rales or rhonchi, normal air movement, no respiratory distress  Cardiovascular: normal rate, normal S1 and S2 and no carotid bruits  Abdomen: soft, non-tender, non-distended, normal bowel sounds, no masses or organomegaly  Extremities: no cyanosis, no clubbing and no edema  Neurologic: no cranial nerve deficit and speech normal    I/O last 3 completed shifts:  In: -   Out: 100 [Urine:100]  No intake/output data recorded.       LABS:  Recent Labs     12/11/21  0511 12/12/21  0615    136   K 3.9 3.8  104   CO2 26 22   BUN 17 16   CREATININE 0.7 0.6   GLUCOSE 97 102*   CALCIUM 9.4 9.4       Recent Labs     12/11/21  0511 12/12/21  0615   WBC 8.8 6.9   RBC 4.05 3.84   HGB 12.4 11.8   HCT 38.1 35.8   MCV 94.1 93.2   MCH 30.6 30.7   MCHC 32.5 33.0   RDW 11.9 11.8    346   MPV 10.0 10.4       No results for input(s): POCGLU in the last 72 hours. Imaging:  CTA PULMONARY W CONTRAST    Result Date: 12/11/2021  EXAMINATION: CTA OF THE CHEST, 12/11/2021 7:29 am TECHNIQUE: CTA of the chest was performed after the administration of intravenous contrast.  Multiplanar reformatted images are provided for review. MIP images are provided for review. Dose modulation, iterative reconstruction, and/or weight based adjustment of the mA/kV was utilized to reduce the radiation dose to as low as reasonably achievable. COMPARISON: None HISTORY: ORDERING SYSTEM PROVIDED HISTORY:  Eval for PE TECHNOLOGIST PROVIDED HISTORY: Reason for Exam:  Eval for PE Decision Support Exception - unselect if not a suspected or confirmed emergency medical condition->Emergency Medical Condition (MA) FINDINGS: Pulmonary Arteries: Pulmonary arteries are adequately opacified for evaluation. No evidence of intraluminal filling defect to suggest pulmonary embolism. Main pulmonary artery is normal in caliber. Mediastinum: Small hiatal hernia. Lungs/pleura:  Mild peripheral indistinct ground-glass and reticular densities mostly in the lower lobes. Small nodule posterolateral right lower lobe, 5 mm size subpleural location, nonspecific but most likely benign. If patient is high risk optional CT at 12 months is recommended. No pleural effusion. Upper Abdomen: No acute abnormality identified on limited evaluation. Soft Tissues/Bones: No acute bone or soft tissue abnormality. 1. Mild mainly lower lobe pulmonary opacities suggestive of minimal atelectasis and possible pneumonitis including atypical infection such as COVID.  2. No acute pulmonary embolus identified. 3. Small right lower lobe pulmonary nodule as described above. Patient Instructions:      Medication List      START taking these medications    ibuprofen 600 MG tablet  Commonly known as: ADVIL;MOTRIN  Take 1 tablet by mouth 3 times daily (with meals) for 14 days        CONTINUE taking these medications    atorvastatin 10 MG tablet  Commonly known as: LIPITOR           Where to Get Your Medications      These medications were sent to [facility] [address] [facility].  Abdelrahman Sarmiento [phone] Kenny Mercer [phone] [place] [id number]    Phone: [phone]   · ibuprofen 600 MG tablet           Note that more than 30 minutes was spent in preparing discharge papers, discussing discharge with patient, medication review, etc.    Signed:  Electronically signed by SHAHEEN Soni CNP on 12/12/2021 at 8:03 AM

## 2021-12-12 NOTE — PROGRESS NOTES
Echo notified that test is pending discharge today. Updated that Echo may not get done today per Echo tech.

## 2021-12-12 NOTE — CARE COORDINATION
CASE MANAGEMENT. .. Discharge order noted on chart. Per chart, patient does not have pcp. I spoke with patient and she states that she follows with Dr Antonio Dove' NP. Patient is concerned that she needs home o2 and wants retested. Nursing notified. At this time, patient is tolerating room air, but should o2 be requrired, she has no dme preference.

## 2021-12-13 LAB
EKG ATRIAL RATE: 65 BPM
EKG P AXIS: 65 DEGREES
EKG P-R INTERVAL: 208 MS
EKG Q-T INTERVAL: 398 MS
EKG QRS DURATION: 86 MS
EKG QTC CALCULATION (BAZETT): 413 MS
EKG R AXIS: 40 DEGREES
EKG T AXIS: 43 DEGREES
EKG VENTRICULAR RATE: 65 BPM

## 2021-12-13 PROCEDURE — 93010 ELECTROCARDIOGRAM REPORT: CPT | Performed by: INTERNAL MEDICINE

## 2021-12-14 ENCOUNTER — CARE COORDINATION (OUTPATIENT)
Dept: CASE MANAGEMENT | Age: 51
End: 2021-12-14

## 2021-12-14 NOTE — CARE COORDINATION
COVID-19 Monitoring Initial Follow-up Note    Call within 2 business days of discharge: Yes. First attempt to reach the patient for COVID Monitoring (positive in November) Care Transition call post hospital discharge. Message left with CTN's contact information requesting return phone call.

## 2021-12-15 ENCOUNTER — CARE COORDINATION (OUTPATIENT)
Dept: CASE MANAGEMENT | Age: 51
End: 2021-12-15

## 2021-12-15 NOTE — CARE COORDINATION
COVID-19 Monitoring Initial Follow-up Note    Call within 2 business days of discharge: Yes. Second attempt to reach the patient for COVID Monitoring (positive in November) Care Transition call post hospital discharge. Message left with CTN's contact information requesting return phone call. Will sign off.
Patient/Caregiver provided printed discharge information.

## 2022-01-10 ENCOUNTER — TELEPHONE (OUTPATIENT)
Dept: CARDIOLOGY | Age: 52
End: 2022-01-10

## 2022-01-14 ENCOUNTER — TELEPHONE (OUTPATIENT)
Dept: NON INVASIVE DIAGNOSTICS | Age: 52
End: 2022-01-14

## 2022-01-21 ENCOUNTER — HOSPITAL ENCOUNTER (OUTPATIENT)
Dept: NON INVASIVE DIAGNOSTICS | Age: 52
Discharge: HOME | End: 2022-01-21
Payer: COMMERCIAL

## 2022-01-21 DIAGNOSIS — R06.02 SHORTNESS OF BREATH: ICD-10-CM

## 2022-01-21 DIAGNOSIS — U07.1 COVID: ICD-10-CM

## 2022-01-21 LAB
LV EF: 68 %
LVEF MODALITY: NORMAL

## 2022-01-21 PROCEDURE — 93306 TTE W/DOPPLER COMPLETE: CPT

## 2022-03-11 ENCOUNTER — TELEPHONE (OUTPATIENT)
Dept: ADMINISTRATIVE | Age: 52
End: 2022-03-11

## 2022-03-11 NOTE — TELEPHONE ENCOUNTER
Pt calling she wanted to know if Dr. Norma Coffman gets any cancellations before 4/8/22 - if you can call her to r/s this apt. She is tentatively scheduled to fly out of town this day - and would prefer another day if possible. For now, she has not cancelled this apt.

## 2022-06-15 ENCOUNTER — OFFICE VISIT (OUTPATIENT)
Dept: CARDIOLOGY CLINIC | Age: 52
End: 2022-06-15
Payer: COMMERCIAL

## 2022-06-15 VITALS
HEIGHT: 62 IN | SYSTOLIC BLOOD PRESSURE: 136 MMHG | BODY MASS INDEX: 29.44 KG/M2 | RESPIRATION RATE: 16 BRPM | HEART RATE: 60 BPM | DIASTOLIC BLOOD PRESSURE: 98 MMHG | WEIGHT: 160 LBS

## 2022-06-15 DIAGNOSIS — I31.9 PERICARDITIS, UNSPECIFIED CHRONICITY, UNSPECIFIED TYPE: ICD-10-CM

## 2022-06-15 DIAGNOSIS — R06.02 SHORTNESS OF BREATH: Primary | ICD-10-CM

## 2022-06-15 DIAGNOSIS — U07.1 COVID-19: ICD-10-CM

## 2022-06-15 DIAGNOSIS — R00.2 PALPITATIONS: ICD-10-CM

## 2022-06-15 PROCEDURE — G8419 CALC BMI OUT NRM PARAM NOF/U: HCPCS | Performed by: CLINICAL NURSE SPECIALIST

## 2022-06-15 PROCEDURE — G8427 DOCREV CUR MEDS BY ELIG CLIN: HCPCS | Performed by: CLINICAL NURSE SPECIALIST

## 2022-06-15 PROCEDURE — 99213 OFFICE O/P EST LOW 20 MIN: CPT | Performed by: CLINICAL NURSE SPECIALIST

## 2022-06-15 PROCEDURE — 3017F COLORECTAL CA SCREEN DOC REV: CPT | Performed by: CLINICAL NURSE SPECIALIST

## 2022-06-15 PROCEDURE — 1036F TOBACCO NON-USER: CPT | Performed by: CLINICAL NURSE SPECIALIST

## 2022-06-15 RX ORDER — ALBUTEROL SULFATE 90 UG/1
AEROSOL, METERED RESPIRATORY (INHALATION)
COMMUNITY
Start: 2022-03-14

## 2022-06-15 RX ORDER — ASCORBIC ACID 500 MG
TABLET ORAL
COMMUNITY

## 2022-06-15 RX ORDER — AMMONIUM LACTATE 12 G/100G
LOTION TOPICAL
COMMUNITY

## 2022-06-15 RX ORDER — ZINC GLUCONATE 50 MG
TABLET ORAL
COMMUNITY

## 2022-06-15 RX ORDER — CYCLOSPORINE 0.5 MG/ML
EMULSION OPHTHALMIC
COMMUNITY

## 2022-06-18 NOTE — PROGRESS NOTES
[facility]     OUTPATIENT CARDIOLOGY FOLLOW-UP    Name: Lurdes Back    Age: 49 y.o. Primary Care Physician: Margaret Inman DO    Date of Service: 6/15/22     Chief Complaint:   Chief Complaint   Patient presents with    Heart Problem     Pt c/o having chest pain and palpitations regularly        Interim History:    Ms. Selina Neri is a 49year old lady who was previously evaluated by Dr. Roderick Sandhoff in June 2019 due to palpitations; subsequent 48 hour Holter monitor showed SR with rare supraventricular and ventricular ectopy (detailed below), and there was no evidence of structural heart disease on echocardiogram.    She was ill with COVID 19 in November 2021, and since then has had persistent dyspnea and fatigue. She describes having mid sternal chest heaviness with the dyspnea, which is worse when lying flat and somewhat improved when she is sitting up; this is no specific relationship to exertion. Echocardiogram in January 2022 showed normal biventricular function with trace MR and no evidence of pericardial effusion. Review of Systems:   Cardiovascular: Chest discomfort and palpitations as above. Her systolic blood pressure has been in the 140s to 150s since March. Respiratory: Denies cough, orthopnea, PND or wheezing. Dyspnea as above. Consitutional: Denies fevers or chills. No excessive weight gain/ loss. Fatigue as above. HEENMT: Denies bleeding gums or epistaxis. She has a history of migraine headaches. Musculoskeletal: Denies myalgias or arthralgias. Neurological: Denies dizziness, syncope, numbness or tingling. Integumentary: Denies rash or ulcerations. Gastrointestinal: Denies nausea, vomiting, abdominal pain, constipation/diarrhea, or dark/bloody stools. Genitourinary: Denies dysuria or blood in urine  Hematologic/Lymphatic: Denies abnormal bruising or bleeding.    Endocrine: Denies temperature intolerance or excessive thirst. Psychiatric: Denies anxiety or depression. Past Medical History:    1. Migraine headaches   2. Hyperlipidemia  3. COVID 19 infection 2021   4. Hospitalized with acute hypoxic respiratory failure in 2021. Treated with NSAIDS and Toradol for questionable viral pericarditis. CTA chest : Mild mainly lower lobe pulmonary opacities suggestive of minimal atelectasis and possible pneumonitis including atypical infection such as COVID. No acute pulmonary embolus identified. Small right lower lobe pulmonary nodule as described above. ESR 60, CRP 1.9    Previous Cardiac Testin hour Holter monitor 2019:   SR, average 72 bpm, minimum 49 bpm, max 157 bpm  <0.1% ventricular ectopy   1.0% supraventricular ectopy (single beats)     TTE 2019:    Summary   Ejection fraction is visually estimated at 55-65%. Normal left atrium. L atrial pressure not elevated   Normal mitral valve structure and function. Structurally normal aortic valve. No pulmonary hypertension   No pericardial effusion    TTE 2022:  Summary   Normal left ventricular size and systolic function. Ejection fraction is visually estimated at 65-70%. Normal diastolic function. No regional wall motion abnormalities seen. Normal left ventricular wall thickness. Normal right ventricular size and function. Trace mitral regurgitation. No evidence of pericardial effusion.     Past Surgical History:  Past Surgical History:   Procedure Laterality Date    HYSTERECTOMY (CERVIX STATUS UNKNOWN)      TOE SURGERY      TUBAL LIGATION         Family History:  Family History   Problem Relation Age of Onset    High Blood Pressure Mother     Cancer Father         Hx of colon CA   · Denies family history of premature coronary disease or sudden death     Social History:  Social History     Socioeconomic History    Marital status: Legally      Spouse name: Not on file    Number of children: Not on file  Years of education: Not on file    Highest education level: Not on file   Occupational History    Not on file   Tobacco Use    Smoking status: Former Smoker     Types: Cigarettes    Smokeless tobacco: Never Used   Vaping Use    Vaping Use: Never used   Substance and Sexual Activity    Alcohol use: Not Currently     Comment: socially    Drug use: No    Sexual activity: Not on file   Other Topics Concern    Not on file   Social History Narrative    Not on file     Social Determinants of Health     Financial Resource Strain:     Difficulty of Paying Living Expenses: Not on file   Food Insecurity:     Worried About [address] in the Last Year: Not on file    [address] in the Last Year: Not on file   Transportation Needs:     Lack of Transportation (Medical): Not on file    Lack of Transportation (Non-Medical): Not on file   Physical Activity:     Days of Exercise per Week: Not on file    Minutes of Exercise per Session: Not on file   Stress:     Feeling of Stress : Not on file   Social Connections:     Frequency of Communication with Friends and Family: Not on file    Frequency of Social Gatherings with Friends and Family: Not on file    Attends Faith Services: Not on file    Active Member of [address] [place] [address] or Organizations: Not on file    Attends Club or Organization Meetings: Not on file    Marital Status: Not on file   Intimate Partner Violence:     Fear of Current or Ex-Partner: Not on file    Emotionally Abused: Not on file    Physically Abused: Not on file    Sexually Abused: Not on file   Housing Stability:     Unable to Pay for Housing in the Last Year: Not on file    Number of [place] in the Last Year: Not on file    Unstable Housing in the Last Year: Not on file       Allergies: Allergies   Allergen Reactions    Keflex [Cephalexin]     Pcn [Penicillins]     Phenergan [Promethazine Hcl]      Pt states it made her pass out.        Current Medications:  Current Outpatient Medications   Medication Sig Dispense Refill    albuterol sulfate HFA (PROVENTIL;VENTOLIN;PROAIR) 108 (90 Base) MCG/ACT inhaler inhale 2 puffs by mouth and INTO THE LUNGS every 4 to 6 hours if needed      ammonium lactate (LAC-HYDRIN) 12 % lotion ammonium lactate 12 % lotion      ascorbic acid (VITAMIN C) 500 MG tablet Vitamin C 500 mg tablet   take 1 tablet by mouth once daily      Cholecalciferol 50 MCG (2000 UT) CAPS Vitamin D3 50 mcg (2,000 unit) capsule   take 1 capsule by mouth once daily      cycloSPORINE (RESTASIS) 0.05 % ophthalmic emulsion Restasis 0.05 % eye drops in a dropperette   instill 1 drop into both eyes twice a day      zinc gluconate 50 MG tablet zinc gluconate 50 mg tablet   take 1 tablet by mouth once daily       No current facility-administered medications for this visit. Physical Exam:  BP (!) 136/98   Pulse 60   Resp 16   Ht 5' 2\" (1.575 m)   Wt 160 lb (72.6 kg)   BMI 29.26 kg/m²   Wt Readings from Last 3 Encounters:   06/15/22 160 lb (72.6 kg)   12/12/21 158 lb (71.7 kg)   06/10/19 147 lb 3.2 oz (66.8 kg)     Appearance: Well developed, well nourished middle aged lady who appears of stated age. No apparent acute distress. Skin: Warm and dry   Head: Normocephalic, atraumatic. Oral mucosa pink and moist.   Neck: Supple, no elevated JVP or carotid bruits  Lungs: Clear to auscultation bilaterally. No wheezes, rales, or rhonchi  Cardiac: Regular rate and rhythm, no murmurs, S3 or S4, or rubs  Abdomen: Soft, non-tender, non-distended. Bowel sounds present. Extremities: No lower extremity edema, intact pedal bilaterally  Neurologic: Normal mood and affect. Alert and oriented to person, place, time. Speech clear and appropriate. Musculoskeletal: Moves all extremities. No gross muscle atrophy. Diagnostics:  ECG today showed SR, 60 bpm with no acute ST-T wave changes      Assessment:   1.   Chest discomfort: suggestive of pericarditis  · Onset in December 2021, following COVID 19 infection: treated with IV Toradol/oral Ibuprofen   · Labs 12/2021: ESR 60, CRP 1.9  · No evidence of pericardial effusion on TTE 12/2021  2. Dyspnea  3. Palpitations  · Longstanding history of same, with no significant arrhythmia on previous ambulatory monitor  4. Hyperlipidemia   5. Hypertension  6. 10 year ASCVD risk 2.3% (based on 2019 lipid panel)       Treatment Plan:  1. Cardiac MRI to rule out myocarditis  2. The diagnosis of hypertension was discussed, with indication for antihypertensive medication. She would like to continue to monitor her blood pressure and review with her PCP before starting pharmacologic therapy. 3. Aggressive risk factor modification, including blood pressure and lipid control  4. Follow up with Dr. Juanito Martinez in one month (or following MRI): she was asked to contact us with questions or concerns prior to then. The patient's current medication list, allergies, problem list and results of all previously ordered testing were reviewed at today's visit.     Electronically signed by SHAHEEN Pandey on 6/18/2022 at [facility]

## 2022-08-08 ENCOUNTER — HOSPITAL ENCOUNTER (OUTPATIENT)
Dept: MRI IMAGING | Age: 52
Discharge: HOME | End: 2022-08-10
Payer: COMMERCIAL

## 2022-08-08 DIAGNOSIS — U07.1 COVID-19: ICD-10-CM

## 2022-08-08 DIAGNOSIS — I31.9 PERICARDITIS, UNSPECIFIED CHRONICITY, UNSPECIFIED TYPE: ICD-10-CM

## 2022-08-08 DIAGNOSIS — R00.2 PALPITATIONS: ICD-10-CM

## 2022-08-08 DIAGNOSIS — R06.02 SHORTNESS OF BREATH: ICD-10-CM

## 2022-08-08 PROCEDURE — 75561 CARDIAC MRI FOR MORPH W/DYE: CPT

## 2022-08-08 PROCEDURE — A9579 GAD-BASE MR CONTRAST NOS,1ML: HCPCS | Performed by: RADIOLOGY

## 2022-08-08 PROCEDURE — 6360000004 HC RX CONTRAST MEDICATION: Performed by: RADIOLOGY

## 2022-08-08 RX ADMIN — GADOTERIDOL 32 ML: 279.3 INJECTION, SOLUTION INTRAVENOUS at 13:27

## 2022-08-10 LAB
LV EF: 61 %
LVEF MODALITY: NORMAL

## 2022-08-10 PROCEDURE — 75561 CARDIAC MRI FOR MORPH W/DYE: CPT | Performed by: INTERNAL MEDICINE

## 2022-08-11 ENCOUNTER — TELEPHONE (OUTPATIENT)
Dept: CARDIOLOGY CLINIC | Age: 52
End: 2022-08-11

## 2022-08-11 NOTE — TELEPHONE ENCOUNTER
Left message for patient to contact office. ----- Message from SHAHEEN Snyder sent at 8/10/2022  4:39 PM EDT -----  Sam Gorman! Can you please let Ms. Yip know that her MRI was normal with no evidence of endocarditis. She can follow up with Dr. Christina Cee as scheduled.      Thank you!   ----- Message -----  From: Thierno Hinojosa Incoming Radiant Results From [facility]  Sent: 8/10/2022   7:50 AM EDT  To: SHAHEEN Snyder

## 2022-09-19 ENCOUNTER — OFFICE VISIT (OUTPATIENT)
Dept: CARDIOLOGY CLINIC | Age: 52
End: 2022-09-19
Payer: COMMERCIAL

## 2022-09-19 VITALS
SYSTOLIC BLOOD PRESSURE: 122 MMHG | HEIGHT: 62 IN | DIASTOLIC BLOOD PRESSURE: 70 MMHG | WEIGHT: 158.9 LBS | BODY MASS INDEX: 29.24 KG/M2 | HEART RATE: 57 BPM | RESPIRATION RATE: 14 BRPM

## 2022-09-19 DIAGNOSIS — I10 ESSENTIAL HYPERTENSION: ICD-10-CM

## 2022-09-19 DIAGNOSIS — R07.89 OTHER CHEST PAIN: ICD-10-CM

## 2022-09-19 DIAGNOSIS — R06.09 DOE (DYSPNEA ON EXERTION): ICD-10-CM

## 2022-09-19 DIAGNOSIS — R00.2 PALPITATION: ICD-10-CM

## 2022-09-19 DIAGNOSIS — R06.02 SHORTNESS OF BREATH: Primary | ICD-10-CM

## 2022-09-19 DIAGNOSIS — E78.5 DYSLIPIDEMIA: ICD-10-CM

## 2022-09-19 DIAGNOSIS — G43.009 MIGRAINE WITHOUT AURA AND WITHOUT STATUS MIGRAINOSUS, NOT INTRACTABLE: ICD-10-CM

## 2022-09-19 PROCEDURE — 99214 OFFICE O/P EST MOD 30 MIN: CPT | Performed by: INTERNAL MEDICINE

## 2022-09-19 PROCEDURE — G8419 CALC BMI OUT NRM PARAM NOF/U: HCPCS | Performed by: INTERNAL MEDICINE

## 2022-09-19 PROCEDURE — 1036F TOBACCO NON-USER: CPT | Performed by: INTERNAL MEDICINE

## 2022-09-19 PROCEDURE — 93000 ELECTROCARDIOGRAM COMPLETE: CPT | Performed by: INTERNAL MEDICINE

## 2022-09-19 PROCEDURE — G8427 DOCREV CUR MEDS BY ELIG CLIN: HCPCS | Performed by: INTERNAL MEDICINE

## 2022-09-19 PROCEDURE — 3017F COLORECTAL CA SCREEN DOC REV: CPT | Performed by: INTERNAL MEDICINE

## 2022-09-19 RX ORDER — ALPRAZOLAM 1 MG/1
1 TABLET ORAL NIGHTLY PRN
COMMUNITY

## 2022-09-19 NOTE — PROGRESS NOTES
Out PATIENT New CARDIOLOGY CONSULT    Name: Lazarus Alpha    Age: 47 y.o. Date of Admission: No admission date for patient encounter. Date of Service: 9/19/2022    Reason for Consultation:   Chief Complaint   Patient presents with    Chest Pain    Shortness of Breath          Referring Physician: No admitting provider for patient encounter. 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 She has undergone extensive cardiac evaluation with echocardiogram revealing normal systolic function and recent cardiac MRI also revealing normal systolic function and no scar noted. She still complains of chest pain, palpitations and fatigue with activities and dyspnea on exertion and subsequently I have arranged for coronary CT angiogram to rule out obstructive coronary artery disease. Zio patch heart monitor and TSH as well as lipid profile is also arranged. Patient is encouraged to get vaccinated and boosted    PMH  1. Chest discomfort: suggestive of pericarditis  Onset in December 2021, following COVID 19 infection: treated with IV Toradol/oral Ibuprofen   Labs 12/2021: ESR 60, CRP 1.9  No evidence of pericardial effusion on TTE 12/2021  2. Dyspnea  3. Palpitations  Longstanding history of same, with no significant arrhythmia on previous ambulatory monitor  4. Hyperlipidemia   5.  Hypertension              Past Medical History:  Past Medical History:   Diagnosis Date    Hyperlipidemia     Migraines        Past Surgical History:  Past Surgical History:   Procedure Laterality Date    HYSTERECTOMY (CERVIX STATUS UNKNOWN)  2009    TOE SURGERY      TUBAL LIGATION  1999       Family History:  Family History   Problem Relation Age of Onset    High Blood Pressure Mother     Cancer Father         Hx of colon CA       Social History:  Social History     Socioeconomic History    Marital status: Legally      Spouse name: Not on file    Number of children: Not on file    Years of education: Not on file    Highest education level: Not on file   Occupational History    Not on file   Tobacco Use    Smoking status: Former     Packs/day: 0.25     Years: 4.00     Pack years: 1.00     Types: Cigarettes     Quit date:      Years since quittin.7    Smokeless tobacco: Never   Vaping Use    Vaping Use: Never used   Substance and Sexual Activity    Alcohol use: Not Currently     Comment: socially    Drug use: No    Sexual activity: Not on file   Other Topics Concern    Not on file   Social History Narrative    Not on file     Social Determinants of Health     Financial Resource Strain: Not on file   Food Insecurity: Not on file   Transportation Needs: Not on file   Physical Activity: Not on file   Stress: Not on file   Social Connections: Not on file   Intimate Partner Violence: Not on file   Housing Stability: Not on file       Allergies: Allergies   Allergen Reactions    Keflex [Cephalexin]     Pcn [Penicillins]     Phenergan [Promethazine Hcl]      Pt states it made her pass out. Home Medications:  Prior to Admission medications    Medication Sig Start Date End Date Taking? Authorizing Provider   ALPRAZolam Geneva Mazariegos) 1 MG tablet Take 1 mg by mouth nightly as needed for Sleep.    Yes Historical Provider, MD   albuterol sulfate HFA (PROVENTIL;VENTOLIN;PROAIR) 108 (90 Base) MCG/ACT inhaler inhale 2 puffs by mouth and INTO THE LUNGS every 4 to 6 hours if needed 3/14/22  Yes Historical Provider, MD   ascorbic acid (VITAMIN C) 500 MG tablet Vitamin C 500 mg tablet   take 1 tablet by mouth once daily   Yes Historical Provider, MD   Cholecalciferol 50 MCG (2000 UT) CAPS Vitamin D3 50 mcg (2,000 unit) capsule   take 1 capsule by mouth once daily Yes Historical Provider, MD   cycloSPORINE (RESTASIS) 0.05 % ophthalmic emulsion Restasis 0.05 % eye drops in a dropperette   instill 1 drop into both eyes twice a day   Yes Historical Provider, MD   zinc gluconate 50 MG tablet zinc gluconate 50 mg tablet   take 1 tablet by mouth once daily   Yes Historical Provider, MD   ammonium lactate (LAC-HYDRIN) 12 % lotion ammonium lactate 12 % lotion  Patient not taking: Reported on 9/19/2022    Historical Provider, MD       Current Medications:  Current Outpatient Medications   Medication Sig Dispense Refill    ALPRAZolam (XANAX) 1 MG tablet Take 1 mg by mouth nightly as needed for Sleep.      albuterol sulfate HFA (PROVENTIL;VENTOLIN;PROAIR) 108 (90 Base) MCG/ACT inhaler inhale 2 puffs by mouth and INTO THE LUNGS every 4 to 6 hours if needed      ascorbic acid (VITAMIN C) 500 MG tablet Vitamin C 500 mg tablet   take 1 tablet by mouth once daily      Cholecalciferol 50 MCG (2000 UT) CAPS Vitamin D3 50 mcg (2,000 unit) capsule   take 1 capsule by mouth once daily      cycloSPORINE (RESTASIS) 0.05 % ophthalmic emulsion Restasis 0.05 % eye drops in a dropperette   instill 1 drop into both eyes twice a day      zinc gluconate 50 MG tablet zinc gluconate 50 mg tablet   take 1 tablet by mouth once daily      ammonium lactate (LAC-HYDRIN) 12 % lotion ammonium lactate 12 % lotion (Patient not taking: Reported on 9/19/2022)       No current facility-administered medications for this visit.              Review of Systems:   Cardiac: As per HPI  General: Denies fever or chills  Pulmonary: As per HPI  HEENT: Denies runny nose  GI: No complaints  : No complaints  Endocrine: Denies night sweats  Musculoskeletal: No complaints  Skin: Dry skin  Neuro: No complaints  Psych: Denies depression    Physical Exam:  /70   Pulse 57   Resp 14   Ht 5' 2\" (1.575 m)   Wt 158 lb 14.4 oz (72.1 kg)   BMI 29.06 kg/m²   Wt Readings from Last 3 Encounters:   09/19/22 158 lb 14.4 oz (72.1 kg) 06/15/22 160 lb (72.6 kg)   12/12/21 158 lb (71.7 kg)       Appearance: Alert and oriented x3 not in acute distress. Skin: Dry skin  Head: Atraumatic  Eyes: Intact extraocular muscles   ENMT: Mucous membranes are moist  Neck: Supple  Lungs: Clear to auscultation  Cardiac: Normal S1 and S2  Abdomen: Protuberant  Extremities: Intact range of motion  Neurologic: No focal neurological deficits  Peripheral Pulses: 2+ peripheral pulses    Intake/Output:  No intake or output data in the 24 hours ending 09/19/22 1636  [unfilled]    Laboratory Tests:  No results for input(s): NA, K, CL, CO2, BUN, CREATININE, GLUCOSE, CALCIUM in the last 72 hours. No results found for: MG  No results for input(s): ALKPHOS, ALT, AST, PROT, BILITOT, BILIDIR, LABALBU in the last 72 hours. No results for input(s): WBC, RBC, HGB, HCT, MCV, MCH, MCHC, RDW, PLT, MPV in the last 72 hours. No results found for: CKTOTAL, CKMB, CKMBINDEX, TROPONINI  No results for input(s): CKTOTAL, CKMB, CKMBINDEX, TROPHS in the last 72 hours. No results found for: INR, PROTIME  No results found for: TSHFT4, TSH  No results found for: LABA1C  No results found for: EAG  No results found for: CHOL  No results found for: TRIG  No results found for: HDL  No results found for: LDLCALC, LDLCHOLESTEROL  No results found for: LABVLDL, VLDL  No results found for: CHOLHDLRATIO  No results for input(s): PROBNP in the last 72 hours. Cardiac Tests:  EKG reviewed (EKG date: Sinus bradycardia 57 bpm):      Echocardiogram reviewed:     Stress test reviewed:      Cardiac catheterization reviewed:       48 hour Holter monitor 6/27/2019:   SR, average 72 bpm, minimum 49 bpm, max 157 bpm  <0.1% ventricular ectopy   1.0% supraventricular ectopy (single beats)      TTE 6/27/2019:    Summary   Ejection fraction is visually estimated at 55-65%. Normal left atrium. L atrial pressure not elevated   Normal mitral valve structure and function.    Structurally normal aortic valve.   No pulmonary hypertension   No pericardial effusion     TTE 1/21/2022:  Summary   Normal left ventricular size and systolic function. Ejection fraction is visually estimated at 65-70%. Normal diastolic function. No regional wall motion abnormalities seen. Normal left ventricular wall thickness. Normal right ventricular size and function. Trace mitral regurgitation. No evidence of pericardial effusion. CXR reviewed:     Cardiac MRI 6/2022  Impression   1. Normal LV size size and function, LVEF 61%   2. Normal RV size and function   3. No significant valvular heart disease. 4.  No evidence of intramyocardial fibrosis, inflammatory pattern,   infiltrative disease or prior infarct   5. No significant pericardial effusion. No evidence of pericarditis. The ASCVD Risk score (Desiree Cardoso., et al., 2013) failed to calculate for the following reasons:    Cannot find a previous HDL lab    Cannot find a previous total cholesterol lab    ASSESSMENT / PLAN:    1. JOHN (dyspnea on exertion)  - Cardiac event monitor; Future  - CTA CORON EJECT FRAC WALL MOTION; Future  - Comprehensive Metabolic Panel; Future  - Lipid Panel; Future  - CBC with Auto Differential; Future  - TSH; Future    2. Shortness of breath  - EKG 12 lead  - Cardiac event monitor; Future  - CTA CORON EJECT FRAC WALL MOTION; Future  - Comprehensive Metabolic Panel; Future  - Lipid Panel; Future  - CBC with Auto Differential; Future  - TSH; Future    3. Palpitation  - Cardiac event monitor; Future  - CTA CORON EJECT FRAC WALL MOTION; Future  - Comprehensive Metabolic Panel; Future  - Lipid Panel; Future  - CBC with Auto Differential; Future  - TSH; Future    4. Other chest pain  - Cardiac event monitor; Future  - CTA CORON EJECT FRAC WALL MOTION; Future  - Comprehensive Metabolic Panel; Future  - Lipid Panel; Future  - CBC with Auto Differential; Future  - TSH; Future    5.  Migraine without aura and without status migrainosus, not intractable  Follow-up with your PCP  6. Dyslipidemia  Lipid profile is arranged  7. Essential hypertension  Blood pressure is stable        Follow up Return in about 3 months (around 12/19/2022). Thank you for allowing me to participate in your patient's care. Please feel free to contact me if you have any questions or concerns.     Cassandra Gandhi MD  [facility]) Cardiology

## 2022-09-19 NOTE — PROGRESS NOTES
Patient was seen today and a 14 day monitor was placed. Monitor was ordered by Dr. Gaynell Merlin . The monitor was applied, instructions were given to the patient. Patient stated understanding and gave verbalize readback.    Monitor company: [employer]  Serial number: [id number]

## 2022-11-04 ENCOUNTER — HOSPITAL ENCOUNTER (OUTPATIENT)
Dept: CT IMAGING | Age: 52
Discharge: HOME | End: 2022-11-06
Payer: COMMERCIAL

## 2022-11-04 VITALS
WEIGHT: 158 LBS | HEART RATE: 64 BPM | TEMPERATURE: 97.8 F | BODY MASS INDEX: 29.08 KG/M2 | HEIGHT: 62 IN | DIASTOLIC BLOOD PRESSURE: 87 MMHG | OXYGEN SATURATION: 99 % | SYSTOLIC BLOOD PRESSURE: 141 MMHG | RESPIRATION RATE: 16 BRPM

## 2022-11-04 DIAGNOSIS — R06.09 DOE (DYSPNEA ON EXERTION): ICD-10-CM

## 2022-11-04 DIAGNOSIS — R00.2 PALPITATION: ICD-10-CM

## 2022-11-04 DIAGNOSIS — R06.02 SHORTNESS OF BREATH: ICD-10-CM

## 2022-11-04 DIAGNOSIS — R07.89 OTHER CHEST PAIN: ICD-10-CM

## 2022-11-04 LAB
ALBUMIN SERPL-MCNC: 4.2 G/DL (ref 3.5–5.2)
ALP BLD-CCNC: 50 U/L (ref 35–104)
ALT SERPL-CCNC: 7 U/L (ref 0–32)
ANION GAP SERPL CALCULATED.3IONS-SCNC: 12 MMOL/L (ref 7–16)
AST SERPL-CCNC: 15 U/L (ref 0–31)
BASOPHILS ABSOLUTE: 0.04 E9/L (ref 0–0.2)
BASOPHILS RELATIVE PERCENT: 0.5 % (ref 0–2)
BILIRUB SERPL-MCNC: 0.2 MG/DL (ref 0–1.2)
BUN BLDV-MCNC: 14 MG/DL (ref 6–20)
CALCIUM SERPL-MCNC: 9.5 MG/DL (ref 8.6–10.2)
CHLORIDE BLD-SCNC: 107 MMOL/L (ref 98–107)
CHOLESTEROL, TOTAL: 228 MG/DL (ref 0–199)
CO2: 20 MMOL/L (ref 22–29)
CREAT SERPL-MCNC: 0.6 MG/DL (ref 0.5–1)
EOSINOPHILS ABSOLUTE: 0.09 E9/L (ref 0.05–0.5)
EOSINOPHILS RELATIVE PERCENT: 1.2 % (ref 0–6)
GFR SERPL CREATININE-BSD FRML MDRD: >60 ML/MIN/1.73
GLUCOSE BLD-MCNC: 97 MG/DL (ref 74–99)
HCT VFR BLD CALC: 37.6 % (ref 34–48)
HDLC SERPL-MCNC: 69 MG/DL
HEMOGLOBIN: 12.9 G/DL (ref 11.5–15.5)
IMMATURE GRANULOCYTES #: 0.02 E9/L
IMMATURE GRANULOCYTES %: 0.3 % (ref 0–5)
LDL CHOLESTEROL CALCULATED: 139 MG/DL (ref 0–99)
LYMPHOCYTES ABSOLUTE: 2.68 E9/L (ref 1.5–4)
LYMPHOCYTES RELATIVE PERCENT: 34.9 % (ref 20–42)
MCH RBC QN AUTO: 32.3 PG (ref 26–35)
MCHC RBC AUTO-ENTMCNC: 34.3 % (ref 32–34.5)
MCV RBC AUTO: 94.2 FL (ref 80–99.9)
MONOCYTES ABSOLUTE: 0.72 E9/L (ref 0.1–0.95)
MONOCYTES RELATIVE PERCENT: 9.4 % (ref 2–12)
NEUTROPHILS ABSOLUTE: 4.12 E9/L (ref 1.8–7.3)
NEUTROPHILS RELATIVE PERCENT: 53.7 % (ref 43–80)
PDW BLD-RTO: 11.7 FL (ref 11.5–15)
PLATELET # BLD: 316 E9/L (ref 130–450)
PMV BLD AUTO: 10.8 FL (ref 7–12)
POTASSIUM SERPL-SCNC: 4.3 MMOL/L (ref 3.5–5)
RBC # BLD: 3.99 E12/L (ref 3.5–5.5)
SODIUM BLD-SCNC: 139 MMOL/L (ref 132–146)
TOTAL PROTEIN: 7.7 G/DL (ref 6.4–8.3)
TRIGL SERPL-MCNC: 101 MG/DL (ref 0–149)
TSH SERPL DL<=0.05 MIU/L-ACNC: 2.33 UIU/ML (ref 0.27–4.2)
VLDLC SERPL CALC-MCNC: 20 MG/DL
WBC # BLD: 7.7 E9/L (ref 4.5–11.5)

## 2022-11-04 PROCEDURE — 80061 LIPID PANEL: CPT

## 2022-11-04 PROCEDURE — 36415 COLL VENOUS BLD VENIPUNCTURE: CPT

## 2022-11-04 PROCEDURE — 2580000003 HC RX 258: Performed by: INTERNAL MEDICINE

## 2022-11-04 PROCEDURE — 75574 CT ANGIO HRT W/3D IMAGE: CPT

## 2022-11-04 PROCEDURE — 84443 ASSAY THYROID STIM HORMONE: CPT

## 2022-11-04 PROCEDURE — 6360000004 HC RX CONTRAST MEDICATION: Performed by: RADIOLOGY

## 2022-11-04 PROCEDURE — 7100000010 HC PHASE II RECOVERY - FIRST 15 MIN

## 2022-11-04 PROCEDURE — 2580000003 HC RX 258: Performed by: RADIOLOGY

## 2022-11-04 PROCEDURE — 6370000000 HC RX 637 (ALT 250 FOR IP): Performed by: INTERNAL MEDICINE

## 2022-11-04 PROCEDURE — 80053 COMPREHEN METABOLIC PANEL: CPT

## 2022-11-04 PROCEDURE — 75574 CT ANGIO HRT W/3D IMAGE: CPT | Performed by: INTERNAL MEDICINE

## 2022-11-04 PROCEDURE — 85025 COMPLETE CBC W/AUTO DIFF WBC: CPT

## 2022-11-04 RX ORDER — NITROFURANTOIN 25; 75 MG/1; MG/1
100 CAPSULE ORAL 2 TIMES DAILY
COMMUNITY

## 2022-11-04 RX ORDER — NITROGLYCERIN 0.4 MG/1
0.8 TABLET SUBLINGUAL ONCE
Status: COMPLETED | OUTPATIENT
Start: 2022-11-04 | End: 2022-11-04

## 2022-11-04 RX ORDER — SODIUM CHLORIDE 0.9 % (FLUSH) 0.9 %
10 SYRINGE (ML) INJECTION
Status: COMPLETED | OUTPATIENT
Start: 2022-11-04 | End: 2022-11-04

## 2022-11-04 RX ORDER — 0.9 % SODIUM CHLORIDE 0.9 %
1000 INTRAVENOUS SOLUTION INTRAVENOUS ONCE
Status: COMPLETED | OUTPATIENT
Start: 2022-11-04 | End: 2022-11-04

## 2022-11-04 RX ADMIN — IOPAMIDOL 70 ML: 755 INJECTION, SOLUTION INTRAVENOUS at 08:14

## 2022-11-04 RX ADMIN — SODIUM CHLORIDE 1000 ML: 9 INJECTION, SOLUTION INTRAVENOUS at 08:01

## 2022-11-04 RX ADMIN — NITROGLYCERIN 0.8 MG: 0.4 TABLET SUBLINGUAL at 08:21

## 2022-11-04 RX ADMIN — Medication 10 ML: at 08:14

## 2022-11-04 ASSESSMENT — PAIN - FUNCTIONAL ASSESSMENT: PAIN_FUNCTIONAL_ASSESSMENT: NONE - DENIES PAIN

## 2022-11-04 NOTE — PROCEDURES
Patient arrived via for CTA coronary arteries. Allergies reviewed, instructions given to  include protocol for heart rate, b/p, necessary medications that will be given, IV site and proper breath hold during scan. Questions answered and expressed understanding confirmed. Placed on monitoring devices for heart rate and rhythm, B/P taken,  IV flushed / started, flushed and prn adapter attached.  Radiologist informed of patient's arrival.

## 2022-11-09 DIAGNOSIS — R07.89 OTHER CHEST PAIN: ICD-10-CM

## 2022-11-09 DIAGNOSIS — R06.02 SHORTNESS OF BREATH: ICD-10-CM

## 2022-11-09 DIAGNOSIS — R06.09 DOE (DYSPNEA ON EXERTION): ICD-10-CM

## 2022-11-09 DIAGNOSIS — R00.2 PALPITATION: ICD-10-CM

## 2022-11-21 ENCOUNTER — TELEPHONE (OUTPATIENT)
Dept: CARDIOLOGY CLINIC | Age: 52
End: 2022-11-21

## 2022-11-21 NOTE — TELEPHONE ENCOUNTER
----- Message from Sharon Todd MD sent at 11/20/2022 10:24 AM EST -----  Please notify patient cholesterol is elevated and you need to follow low-fat diet and exercise for 3 months and follow-up with her PCP to repeat lipid profile and if it is still high she will benefit from medication treatment.

## 2022-11-21 NOTE — TELEPHONE ENCOUNTER
----- Message from Jl Lucas MD sent at 11/20/2022 10:54 AM EST -----  Please notify patient coronary CT angiogram revealed no blockages.

## 2022-11-21 NOTE — TELEPHONE ENCOUNTER
----- Message from Jackson Gleason MD sent at 11/20/2022 10:47 AM EST -----  Please notify pt that there was No dangerous abnormal heart rhythm or arrhythmias noted. Details of heart monitor will be discussed during follow up visit. Call if there are symptoms for earlier visit.

## 2023-01-27 ENCOUNTER — OFFICE VISIT (OUTPATIENT)
Dept: CARDIOLOGY CLINIC | Age: 53
End: 2023-01-27

## 2023-01-27 VITALS
BODY MASS INDEX: 30.57 KG/M2 | DIASTOLIC BLOOD PRESSURE: 80 MMHG | WEIGHT: 166.1 LBS | RESPIRATION RATE: 16 BRPM | HEIGHT: 62 IN | SYSTOLIC BLOOD PRESSURE: 120 MMHG | HEART RATE: 67 BPM

## 2023-01-27 DIAGNOSIS — R53.83 OTHER FATIGUE: ICD-10-CM

## 2023-01-27 DIAGNOSIS — I20.8 OTHER FORMS OF ANGINA PECTORIS (HCC): Primary | ICD-10-CM

## 2023-01-27 DIAGNOSIS — G43.101 MIGRAINE WITH AURA AND WITH STATUS MIGRAINOSUS, NOT INTRACTABLE: ICD-10-CM

## 2023-01-27 DIAGNOSIS — I10 ESSENTIAL HYPERTENSION: ICD-10-CM

## 2023-01-27 DIAGNOSIS — R06.09 DOE (DYSPNEA ON EXERTION): ICD-10-CM

## 2023-01-27 DIAGNOSIS — F41.9 ANXIETY: ICD-10-CM

## 2023-01-27 DIAGNOSIS — E78.5 DYSLIPIDEMIA: ICD-10-CM

## 2023-01-27 DIAGNOSIS — R00.2 PALPITATION: ICD-10-CM

## 2023-01-27 DIAGNOSIS — R06.02 SHORTNESS OF BREATH: ICD-10-CM

## 2023-01-27 RX ORDER — LIFITEGRAST 50 MG/ML
SOLUTION/ DROPS OPHTHALMIC
COMMUNITY
Start: 2022-12-19

## 2023-01-27 RX ORDER — ISOSORBIDE MONONITRATE 30 MG/1
15 TABLET, EXTENDED RELEASE ORAL DAILY
Qty: 60 TABLET | Refills: 3 | Status: SHIPPED | OUTPATIENT
Start: 2023-01-27

## 2023-01-27 NOTE — PROGRESS NOTES
Out PATIENT New CARDIOLOGY CONSULT    Name: Jill Rader    Age: 47 y.o. Date of Admission: No admission date for patient encounter. Date of Service: 1/29/2023    Reason for Consultation:   Chief Complaint   Patient presents with    Heart Problem     3 month OV-Patient complains of chest pains, SOB, and palpitations. Referring Physician: No admitting provider for patient encounter. History of Present Illness: 61-year-old female with history of recurrent COVID-19 infection, hypertension, hyperlipidemia, asthma and longstanding history of palpitations who report she has had COVID-19 twice and since having this infection she is having dyspnea on exertion and fatigue with activities as well as chest discomfort. She has undergone extensive cardiac evaluation with echocardiogram revealing normal systolic function and recent cardiac MRI also revealing normal systolic function and no scar noted. She also underwent coronary CT angiogram which was unrevealing. Recent heart monitor was unrevealing. TSH has been normal and lipid profile has been mildly elevated but patient prefer lifestyle modification and diet to see if lipid profile improved. On today's visit reports still having symptoms and states when he went for a CT coronary angiogram nitroglycerin was given that helped relieve all the symptoms he has been having. Subsequently have prescribed low-dose Imdur 15 mg daily as a start to see if symptoms will improve and to see if blood pressure will tolerate this medication and she will follow-up in 3 months for close monitoring and medication adjustment. Zio patch heart monitor and TSH as well as lipid profile is also arranged. Patient is encouraged to get vaccinated and boosted    PMH  1.   Chest discomfort: suggestive of pericarditis  Onset in December 2021, following COVID 19 infection: treated with IV Toradol/oral Ibuprofen   Labs 12/2021: ESR 60, CRP 1.9  No evidence of pericardial effusion on TTE 2021  2. Dyspnea  3. Palpitations  Longstanding history of same, with no significant arrhythmia on previous ambulatory monitor  4. Hyperlipidemia   5. Hypertension              Past Medical History:  Past Medical History:   Diagnosis Date    Hyperlipidemia     Migraines        Past Surgical History:  Past Surgical History:   Procedure Laterality Date    HYSTERECTOMY (CERVIX STATUS UNKNOWN)  2009    TOE SURGERY      TUBAL LIGATION         Family History:  Family History   Problem Relation Age of Onset    High Blood Pressure Mother     Cancer Father         Hx of colon CA       Social History:  Social History     Socioeconomic History    Marital status: Legally      Spouse name: Not on file    Number of children: Not on file    Years of education: Not on file    Highest education level: Not on file   Occupational History    Not on file   Tobacco Use    Smoking status: Former     Packs/day: 0.25     Years: 4.00     Pack years: 1.00     Types: Cigarettes     Quit date:      Years since quittin.0    Smokeless tobacco: Never   Vaping Use    Vaping Use: Never used   Substance and Sexual Activity    Alcohol use: Yes     Comment: socially    Drug use: No    Sexual activity: Not on file   Other Topics Concern    Not on file   Social History Narrative    Not on file     Social Determinants of Health     Financial Resource Strain: Not on file   Food Insecurity: Not on file   Transportation Needs: Not on file   Physical Activity: Not on file   Stress: Not on file   Social Connections: Not on file   Intimate Partner Violence: Not on file   Housing Stability: Not on file       Allergies: Allergies   Allergen Reactions    Keflex [Cephalexin]     Pcn [Penicillins]     Phenergan [Promethazine Hcl]      Pt states it made her pass out. Home Medications:  Prior to Admission medications    Medication Sig Start Date End Date Taking?  Authorizing Provider   XIIDRA 5 % SOLN instill 1 drop into both eyes twice a day 12/19/22  Yes Historical Provider, MD   isosorbide mononitrate (IMDUR) 30 MG extended release tablet Take 0.5 tablets by mouth daily 1/27/23  Yes Lou Ritchie MD   ALPRAZolam (XANAX) 1 MG tablet Take 1 mg by mouth nightly as needed for Sleep.    Yes Historical Provider, MD   albuterol sulfate HFA (PROVENTIL;VENTOLIN;PROAIR) 108 (90 Base) MCG/ACT inhaler inhale 2 puffs by mouth and INTO THE LUNGS every 4 to 6 hours if needed 3/14/22  Yes Historical Provider, MD   ascorbic acid (VITAMIN C) 500 MG tablet Vitamin C 500 mg tablet   take 1 tablet by mouth once daily   Yes Historical Provider, MD   Cholecalciferol 50 MCG (2000 UT) CAPS Vitamin D3 50 mcg (2,000 unit) capsule   take 1 capsule by mouth once daily   Yes Historical Provider, MD   nitrofurantoin, macrocrystal-monohydrate, (MACROBID) 100 MG capsule Take 100 mg by mouth 2 times daily  Patient not taking: Reported on 1/27/2023    Historical Provider, MD   ammonium lactate (LAC-HYDRIN) 12 % lotion ammonium lactate 12 % lotion  Patient not taking: No sig reported    Historical Provider, MD   cycloSPORINE (RESTASIS) 0.05 % ophthalmic emulsion Restasis 0.05 % eye drops in a dropperette   instill 1 drop into both eyes twice a day  Patient not taking: Reported on 1/27/2023    Historical Provider, MD   zinc gluconate 50 MG tablet zinc gluconate 50 mg tablet   take 1 tablet by mouth once daily  Patient not taking: Reported on 1/27/2023    Historical Provider, MD       Current Medications:  Current Outpatient Medications   Medication Sig Dispense Refill    XIIDRA 5 % SOLN instill 1 drop into both eyes twice a day      isosorbide mononitrate (IMDUR) 30 MG extended release tablet Take 0.5 tablets by mouth daily 60 tablet 3    ALPRAZolam (XANAX) 1 MG tablet Take 1 mg by mouth nightly as needed for Sleep.      albuterol sulfate HFA (PROVENTIL;VENTOLIN;PROAIR) 108 (90 Base) MCG/ACT inhaler inhale 2 puffs by mouth and INTO THE LUNGS every 4 to 6 hours if needed      ascorbic acid (VITAMIN C) 500 MG tablet Vitamin C 500 mg tablet   take 1 tablet by mouth once daily      Cholecalciferol 50 MCG (2000 UT) CAPS Vitamin D3 50 mcg (2,000 unit) capsule   take 1 capsule by mouth once daily      nitrofurantoin, macrocrystal-monohydrate, (MACROBID) 100 MG capsule Take 100 mg by mouth 2 times daily (Patient not taking: Reported on 1/27/2023)      ammonium lactate (LAC-HYDRIN) 12 % lotion ammonium lactate 12 % lotion (Patient not taking: No sig reported)      cycloSPORINE (RESTASIS) 0.05 % ophthalmic emulsion Restasis 0.05 % eye drops in a dropperette   instill 1 drop into both eyes twice a day (Patient not taking: Reported on 1/27/2023)      zinc gluconate 50 MG tablet zinc gluconate 50 mg tablet   take 1 tablet by mouth once daily (Patient not taking: Reported on 1/27/2023)       No current facility-administered medications for this visit. Review of Systems:   Cardiac: As per HPI  General: Denies fever or chills  Pulmonary: As per HPI  HEENT: Denies runny nose  GI: No complaints  : No complaints  Endocrine: Denies night sweats  Musculoskeletal: No complaints  Skin: Dry skin  Neuro: No complaints  Psych: Denies depression    Physical Exam:  /80   Pulse 67   Resp 16   Ht 5' 2\" (1.575 m)   Wt 166 lb 1.6 oz (75.3 kg)   BMI 30.38 kg/m²   Wt Readings from Last 3 Encounters:   01/27/23 166 lb 1.6 oz (75.3 kg)   11/04/22 158 lb (71.7 kg)   09/19/22 158 lb 14.4 oz (72.1 kg)       Appearance: Alert and oriented x3 not in acute distress.   Skin: Dry skin  Head: Atraumatic  Eyes: Intact extraocular muscles   ENMT: Mucous membranes are moist  Neck: Supple  Lungs: Clear to auscultation  Cardiac: Normal S1 and S2  Abdomen: Protuberant  Extremities: Intact range of motion  Neurologic: No focal neurological deficits  Peripheral Pulses: 2+ peripheral pulses    Intake/Output:  No intake or output data in the 24 hours ending 01/29/23 1307  [unfilled]    Laboratory Tests:  No results for input(s): NA, K, CL, CO2, BUN, CREATININE, GLUCOSE, CALCIUM in the last 72 hours. No results found for: MG  No results for input(s): ALKPHOS, ALT, AST, PROT, BILITOT, BILIDIR, LABALBU in the last 72 hours. No results for input(s): WBC, RBC, HGB, HCT, MCV, MCH, MCHC, RDW, PLT, MPV in the last 72 hours. No results found for: CKTOTAL, CKMB, CKMBINDEX, TROPONINI  No results for input(s): CKTOTAL, CKMB, CKMBINDEX, TROPHS in the last 72 hours. No results found for: INR, PROTIME  Lab Results   Component Value Date    TSH 2.330 11/04/2022     No results found for: LABA1C  No results found for: EAG  Lab Results   Component Value Date    CHOL 228 (H) 11/04/2022     Lab Results   Component Value Date    TRIG 101 11/04/2022     Lab Results   Component Value Date    HDL 69 11/04/2022     Lab Results   Component Value Date    LDLCALC 139 (H) 11/04/2022     Lab Results   Component Value Date    LABVLDL 20 11/04/2022     No results found for: CHOLHDLRATIO  No results for input(s): PROBNP in the last 72 hours. Cardiac Tests:  EKG reviewed (EKG date: Sinus bradycardia 67 bpm):      Echocardiogram reviewed:     Stress test reviewed:      Cardiac catheterization reviewed:       48 hour Holter monitor 6/27/2019:   SR, average 72 bpm, minimum 49 bpm, max 157 bpm  <0.1% ventricular ectopy   1.0% supraventricular ectopy (single beats)      TTE 6/27/2019:    Summary   Ejection fraction is visually estimated at 55-65%. Normal left atrium. L atrial pressure not elevated   Normal mitral valve structure and function. Structurally normal aortic valve. No pulmonary hypertension   No pericardial effusion     TTE 1/21/2022:  Summary   Normal left ventricular size and systolic function. Ejection fraction is visually estimated at 65-70%. Normal diastolic function. No regional wall motion abnormalities seen. Normal left ventricular wall thickness. Normal right ventricular size and function.    Trace mitral regurgitation. No evidence of pericardial effusion. CXR reviewed:     Cardiac MRI 6/2022  Impression   1. Normal LV size size and function, LVEF 61%   2. Normal RV size and function   3. No significant valvular heart disease. 4.  No evidence of intramyocardial fibrosis, inflammatory pattern,   infiltrative disease or prior infarct   5. No significant pericardial effusion. No evidence of pericarditis. CTA coronary:11/2022    NON-CARDIAC FINDINGS:       Noncardiac findings section interpreted by radiology. Airway is patent. No endobronchial lesions. Lungs are clear. No concerning mediastinal nor hilar lymph nodes. Normal appearance of the thoracic aorta and pulmonary arterial system as   imaged. Heart chambers are not enlarged. No pericardial effusion. Normal course and appearance of the esophagus. Imaged upper abdomen is unremarkable. There are degenerative changes in the imaged thoracic spine. No destructive   lytic or blastic abnormality. Impression   1. Normal coronary CT angiogram       2. Zero calcium score       3. Normal LV size. CAD-RADS: 0. Maximal stenosis 2%. Interpretation, no CAD. Zio Patch 11/19/2022  Preliminary Findings Final Interpretation  Patient had a min HR of 48 bpm, max HR of 179 bpm, and avg HR of 75  bpm. Predominant underlying rhythm was Sinus Rhythm. 1 run of  Supraventricular Tachycardia occurred lasting 6 beats with a max rate of  111 bpm (avg 106 bpm). Isolated SVEs were rare (<1.0%), SVE Couplets  were rare (<1.0%), and SVE Triplets were rare (<1.0%). Isolated VEs were  rare (<1.0%), VE Couplets were rare (<1.0%), and no VE Triplets were  present.       The 10-year ASCVD risk score ([place] DK, et al., 2019) is: 1.4%    Values used to calculate the score:      Age: 47 years      Sex: Female      Is Non- : Yes      Diabetic: No      Tobacco smoker: No      Systolic Blood Pressure: 120 mmHg      Is BP treated: No      HDL Cholesterol: 69 mg/dL      Total Cholesterol: 228 mg/dL    ASSESSMENT / PLAN:    1. JOHN (dyspnea on exertion)  She has undergone extensive cardiac evaluation with echocardiogram revealing normal systolic function and recent cardiac MRI also revealing normal systolic function and no scar noted. She also underwent coronary CT angiogram which was unrevealing. Recent zio patch heart monitor was unrevealing. TSH has been normal and lipid profile has been mildly elevated but patient prefer lifestyle modification and diet to see if lipid profile improved. On today's visit reports still having symptoms and states when he went for a CT coronary angiogram nitroglycerin was given that helped relieve all the symptoms he has been having. Subsequently have prescribed low-dose Imdur 15 mg daily as a start to see if symptoms will improve and to see if blood pressure will tolerate this medication and she will follow-up in 3 months for close monitoring and medication adjustment. 2. Shortness of breath  As mentioned above  3. Palpitation  As mentioned above  She has not been interested in beta-blocker    4. Other chest pain  As mentioned above    5. Migraine without aura and without status migrainosus, not intractable  Follow-up with your PCP  6. Dyslipidemia  She will try lifestyle modification and diet for 3 months and repeat lipid profile with PCP to see if LDL has improved    7. Essential hypertension  Blood pressure is stable    8. Anxiety  Follow-up with her PCP        Follow up Return in about 3 months (around 4/27/2023). Thank you for allowing me to participate in your patient's care. Please feel free to contact me if you have any questions or concerns.     Johanne Tom MD  [facility]) Cardiology

## 2023-05-12 ENCOUNTER — OFFICE VISIT (OUTPATIENT)
Dept: CARDIOLOGY CLINIC | Age: 53
End: 2023-05-12

## 2023-05-12 VITALS
HEIGHT: 62 IN | BODY MASS INDEX: 30.18 KG/M2 | SYSTOLIC BLOOD PRESSURE: 126 MMHG | RESPIRATION RATE: 16 BRPM | WEIGHT: 164 LBS | OXYGEN SATURATION: 97 % | HEART RATE: 62 BPM | DIASTOLIC BLOOD PRESSURE: 78 MMHG

## 2023-05-12 DIAGNOSIS — G43.101 MIGRAINE WITH AURA AND WITH STATUS MIGRAINOSUS, NOT INTRACTABLE: ICD-10-CM

## 2023-05-12 DIAGNOSIS — F41.9 ANXIETY: ICD-10-CM

## 2023-05-12 DIAGNOSIS — R06.02 SHORTNESS OF BREATH: ICD-10-CM

## 2023-05-12 DIAGNOSIS — I10 ESSENTIAL HYPERTENSION: ICD-10-CM

## 2023-05-12 DIAGNOSIS — R00.2 PALPITATION: ICD-10-CM

## 2023-05-12 DIAGNOSIS — R06.09 DOE (DYSPNEA ON EXERTION): ICD-10-CM

## 2023-05-12 DIAGNOSIS — R13.11 ORAL PHASE DYSPHAGIA: ICD-10-CM

## 2023-05-12 DIAGNOSIS — R53.83 OTHER FATIGUE: ICD-10-CM

## 2023-05-12 DIAGNOSIS — I20.8 OTHER FORMS OF ANGINA PECTORIS (HCC): Primary | ICD-10-CM

## 2023-05-12 DIAGNOSIS — R13.10 DYSPHAGIA, UNSPECIFIED TYPE: ICD-10-CM

## 2023-05-12 RX ORDER — AMLODIPINE BESYLATE 2.5 MG/1
2.5 TABLET ORAL DAILY
Qty: 30 TABLET | Refills: 3 | Status: SHIPPED | OUTPATIENT
Start: 2023-05-12

## 2023-05-12 NOTE — PROGRESS NOTES
Out PATIENT New CARDIOLOGY CONSULT    Name: Annie Martinez    Age: 47 y.o. Date of Admission: No admission date for patient encounter. Date of Service: 5/14/2023    Reason for Consultation:   Chief Complaint   Patient presents with    Palpitations          Referring Physician: No admitting provider for patient encounter. History of Present Illness: 50-year-old female with history of recurrent COVID-19 infection, hypertension, hyperlipidemia, asthma and longstanding history of palpitations who report she has had COVID-19 twice and since having this infection she is having dyspnea on exertion and fatigue with activities as well as chest discomfort. She has undergone extensive cardiac evaluation with echocardiogram revealing normal systolic function and recent cardiac MRI also revealing normal systolic function and no scar noted. She also underwent coronary CT angiogram which was unrevealing. Recent heart monitor was unrevealing. TSH has been normal and lipid profile has been mildly elevated but patient prefer lifestyle modification and diet to see if lipid profile improved. During last visit she reported her chest pain improved with sublingual nitro at some point and subsequently Imdur was ordered but patient did not tolerate this medication. She present for follow-up visit today and reports still having palpitations and chest discomfort and dysphagia. Low-dose amlodipine is ordered to see if this will help. I have also arrange for a repeat Zio patch heart monitor. Patient reports he has been up-to-date with mammogram and routine physical examination with her PCP has revealed no concerns on breast examination. On physical examination today (in the presence of a chaperone), there is discomfort when the chest is pressed but patient reports the pain is different from the intermittent chest pain she been having.   She will follow-up in 3 months for close monitoring        Zio patch heart monitor

## 2023-05-12 NOTE — PROGRESS NOTES
Patient was seen today and a 14 day Zio Xt monitor was placed. Monitor was ordered by Dr. Vidhya Gibbons. The monitor was applied, instructions were given to the patient. Patient stated understanding and gave verbalize readback.    Monitor company: [employer]  Serial number: [id number]

## 2023-10-03 ENCOUNTER — OFFICE VISIT (OUTPATIENT)
Dept: CARDIOLOGY CLINIC | Age: 53
End: 2023-10-03
Payer: COMMERCIAL

## 2023-10-03 VITALS
HEART RATE: 72 BPM | SYSTOLIC BLOOD PRESSURE: 122 MMHG | DIASTOLIC BLOOD PRESSURE: 78 MMHG | RESPIRATION RATE: 18 BRPM | BODY MASS INDEX: 29.21 KG/M2 | HEIGHT: 62 IN | WEIGHT: 158.7 LBS

## 2023-10-03 DIAGNOSIS — R53.83 OTHER FATIGUE: ICD-10-CM

## 2023-10-03 DIAGNOSIS — F41.9 ANXIETY: ICD-10-CM

## 2023-10-03 DIAGNOSIS — R13.12 OROPHARYNGEAL DYSPHAGIA: ICD-10-CM

## 2023-10-03 DIAGNOSIS — I10 ESSENTIAL HYPERTENSION: ICD-10-CM

## 2023-10-03 DIAGNOSIS — G43.019 INTRACTABLE MIGRAINE WITHOUT AURA AND WITHOUT STATUS MIGRAINOSUS: ICD-10-CM

## 2023-10-03 DIAGNOSIS — I20.8 OTHER FORMS OF ANGINA PECTORIS: Primary | ICD-10-CM

## 2023-10-03 DIAGNOSIS — R00.2 PALPITATION: ICD-10-CM

## 2023-10-03 PROCEDURE — 1036F TOBACCO NON-USER: CPT | Performed by: INTERNAL MEDICINE

## 2023-10-03 PROCEDURE — 99204 OFFICE O/P NEW MOD 45 MIN: CPT | Performed by: INTERNAL MEDICINE

## 2023-10-03 PROCEDURE — G8427 DOCREV CUR MEDS BY ELIG CLIN: HCPCS | Performed by: INTERNAL MEDICINE

## 2023-10-03 PROCEDURE — 93000 ELECTROCARDIOGRAM COMPLETE: CPT | Performed by: INTERNAL MEDICINE

## 2023-10-03 PROCEDURE — 3078F DIAST BP <80 MM HG: CPT | Performed by: INTERNAL MEDICINE

## 2023-10-03 PROCEDURE — 3074F SYST BP LT 130 MM HG: CPT | Performed by: INTERNAL MEDICINE

## 2023-10-03 PROCEDURE — 3017F COLORECTAL CA SCREEN DOC REV: CPT | Performed by: INTERNAL MEDICINE

## 2023-10-03 PROCEDURE — G8417 CALC BMI ABV UP PARAM F/U: HCPCS | Performed by: INTERNAL MEDICINE

## 2023-10-03 PROCEDURE — G8484 FLU IMMUNIZE NO ADMIN: HCPCS | Performed by: INTERNAL MEDICINE

## 2023-10-03 RX ORDER — BUSPIRONE HYDROCHLORIDE 10 MG/1
TABLET ORAL
COMMUNITY
Start: 2023-09-27

## 2023-10-03 RX ORDER — RIMEGEPANT SULFATE 75 MG/75MG
TABLET, ORALLY DISINTEGRATING ORAL
COMMUNITY
Start: 2023-09-27

## 2023-10-03 RX ORDER — FLUTICASONE PROPIONATE AND SALMETEROL 500; 50 UG/1; UG/1
1 POWDER RESPIRATORY (INHALATION) EVERY 12 HOURS
COMMUNITY

## 2023-10-03 RX ORDER — ATORVASTATIN CALCIUM 80 MG/1
80 TABLET, FILM COATED ORAL DAILY
COMMUNITY

## 2023-10-03 NOTE — PROGRESS NOTES
Out PATIENT New CARDIOLOGY CONSULT    Name: Lysle Scheuermann    Age: 47 y.o. Date of Admission: No admission date for patient encounter. Date of Service: 10/4/2023    Reason for Consultation:   Chief Complaint   Patient presents with    Follow-up     3 month ov    Other     chest pressure, not currently          Referring Physician: No admitting provider for patient encounter. History of Present Illness: 78-year-old female with history of recurrent COVID-19 infection, hypertension, hyperlipidemia, asthma and longstanding history of palpitations who report she has had COVID-19 twice and since having this infection she is having dyspnea on exertion and fatigue with activities as well as chest discomfort. She has undergone extensive cardiac evaluation with echocardiogram revealing normal systolic function and recent cardiac MRI also revealing normal systolic function and no scar noted. She also underwent coronary CT angiogram which was unrevealing. Recent heart monitor in November 2022 was unrevealing. TSH has been normal .  Despite extensive work-up, patient continues to report chest pain and palpitations. She was placed on Imdur but could not tolerate this medication. Her chest pain has been reproducible. During last visit because of palpitation repeat Zio patch was arranged and this was placed on the patient in May 2023 but we still have not received the results although patient reports he sent the device to [facility]. [place] called the company and they report they have not received the repeat Zio patch in May 2023 from the patient. On today's visit she reports she had EGD and there was no abnormal finding according to the patient. Patient reports he has appointment with [facility] for second opinion on November 21, 2023. She also preferred to follow-up closely for close monitoring and preferred to follow-up in 3 months.           Zio patch heart monitor and TSH as well as lipid

## 2023-10-05 ENCOUNTER — TELEPHONE (OUTPATIENT)
Dept: CARDIOLOGY CLINIC | Age: 53
End: 2023-10-05

## 2023-10-05 NOTE — TELEPHONE ENCOUNTER
Spoke with patient she stated she sent monitor in to [facility]. Called [employer] checked tracking monitor is listed as lost in system. Please advise what to do next thank you.

## 2023-10-24 ENCOUNTER — HOSPITAL ENCOUNTER (OUTPATIENT)
Dept: CT IMAGING | Age: 53
Discharge: HOME | End: 2023-10-26
Attending: INTERNAL MEDICINE
Payer: COMMERCIAL

## 2023-10-24 DIAGNOSIS — R91.1 LUNG NODULE: ICD-10-CM

## 2023-10-24 PROCEDURE — 71250 CT THORAX DX C-: CPT

## 2023-11-07 NOTE — TELEPHONE ENCOUNTER
I spoke with patient and she said she is seeing a cardiologist at [facility] on 11/21, she will decide then if she wants to repeat monitor.